# Patient Record
Sex: FEMALE | Race: WHITE | NOT HISPANIC OR LATINO | Employment: FULL TIME | URBAN - METROPOLITAN AREA
[De-identification: names, ages, dates, MRNs, and addresses within clinical notes are randomized per-mention and may not be internally consistent; named-entity substitution may affect disease eponyms.]

---

## 2021-07-30 ENCOUNTER — OFFICE VISIT (OUTPATIENT)
Dept: FAMILY MEDICINE | Facility: CLINIC | Age: 51
End: 2021-07-30
Payer: COMMERCIAL

## 2021-07-30 VITALS
BODY MASS INDEX: 25.03 KG/M2 | SYSTOLIC BLOOD PRESSURE: 143 MMHG | WEIGHT: 169 LBS | HEART RATE: 91 BPM | DIASTOLIC BLOOD PRESSURE: 90 MMHG | TEMPERATURE: 97.1 F | OXYGEN SATURATION: 97 % | HEIGHT: 69 IN

## 2021-07-30 DIAGNOSIS — F43.22 ADJUSTMENT DISORDER WITH ANXIOUS MOOD: Primary | ICD-10-CM

## 2021-07-30 DIAGNOSIS — Z13.6 CARDIOVASCULAR SCREENING; LDL GOAL LESS THAN 160: ICD-10-CM

## 2021-07-30 DIAGNOSIS — Z13.1 SCREENING FOR DIABETES MELLITUS (DM): ICD-10-CM

## 2021-07-30 DIAGNOSIS — R79.89 ELEVATED LFTS: ICD-10-CM

## 2021-07-30 DIAGNOSIS — Z13.29 SCREENING FOR THYROID DISORDER: ICD-10-CM

## 2021-07-30 DIAGNOSIS — Z12.31 ENCOUNTER FOR SCREENING MAMMOGRAM FOR BREAST CANCER: ICD-10-CM

## 2021-07-30 DIAGNOSIS — R73.09 ELEVATED GLUCOSE: ICD-10-CM

## 2021-07-30 PROBLEM — S43.102A ACROMIOCLAVICULAR JOINT SEPARATION, LEFT, INITIAL ENCOUNTER: Status: ACTIVE | Noted: 2020-04-30

## 2021-07-30 PROCEDURE — 99204 OFFICE O/P NEW MOD 45 MIN: CPT | Performed by: NURSE PRACTITIONER

## 2021-07-30 RX ORDER — HYDROXYZINE HYDROCHLORIDE 25 MG/1
25-50 TABLET, FILM COATED ORAL EVERY 8 HOURS PRN
Qty: 30 TABLET | Refills: 1 | Status: SHIPPED | OUTPATIENT
Start: 2021-07-30 | End: 2022-02-11

## 2021-07-30 ASSESSMENT — ENCOUNTER SYMPTOMS
CHILLS: 0
BREAST MASS: 0
CONSTIPATION: 0
PARESTHESIAS: 0
HEADACHES: 0
EYE PAIN: 0
JOINT SWELLING: 0
ABDOMINAL PAIN: 0
MYALGIAS: 0
HEMATOCHEZIA: 0
SORE THROAT: 0
HEARTBURN: 0
DYSURIA: 0
COUGH: 0
FEVER: 0
DIARRHEA: 0
WEAKNESS: 0
FREQUENCY: 0
PALPITATIONS: 0
SHORTNESS OF BREATH: 0
HEMATURIA: 0
DIZZINESS: 0
ARTHRALGIAS: 1
NERVOUS/ANXIOUS: 1
NAUSEA: 0

## 2021-07-30 ASSESSMENT — MIFFLIN-ST. JEOR: SCORE: 1455.96

## 2021-07-30 NOTE — PROGRESS NOTES
Subjective   Samantha is a 50 year old who presents for the following health issues     Healthy Habits:     Getting at least 3 servings of Calcium per day:  Yes    Bi-annual eye exam:  NO    Dental care twice a year:  Yes    Sleep apnea or symptoms of sleep apnea:  None    Diet:  Regular (no restrictions)    Frequency of exercise:  2-3 days/week    Duration of exercise:  15-30 minutes    Taking medications regularly:  Not Applicable    Medication side effects:  Not applicable    PHQ-2 Total Score: 0    Additional concerns today:  No       Pt here today to meet with provider to potentially est care  Has some anxiety abut establishing care   Has not taken care of some of some her basic health care   Has some anxiety lately but healthcare is a big trigger  Just lost mom in the last year due to heart disease   Can remember  shots with her anxiety       Labs reviewed in EPIC  BP Readings from Last 3 Encounters:   07/30/21 (!) 143/90   08/13/10 140/80    Wt Readings from Last 3 Encounters:   07/30/21 76.7 kg (169 lb)                  Patient Active Problem List   Diagnosis     Papanicolaou smear of cervix with positive high risk human papilloma virus (HPV) test     No past surgical history on file.    Social History     Tobacco Use     Smoking status: Never Smoker     Smokeless tobacco: Never Used   Substance Use Topics     Alcohol use: No     No family history on file.      No current outpatient medications on file.     Allergies   Allergen Reactions     Pcn [Penicillins]      Stops breathing     Sulfa Drugs Rash         Review of Systems   Constitutional: Negative for chills and fever.   HENT: Negative for congestion, ear pain, hearing loss and sore throat.    Eyes: Negative for pain and visual disturbance.   Respiratory: Negative for cough and shortness of breath.    Cardiovascular: Negative for chest pain, palpitations and peripheral edema.   Gastrointestinal: Negative for abdominal pain, constipation,  "diarrhea, heartburn, hematochezia and nausea.   Breasts:  Negative for tenderness, breast mass and discharge.   Genitourinary: Negative for dysuria, frequency, genital sores, hematuria, pelvic pain, urgency, vaginal bleeding and vaginal discharge.   Musculoskeletal: Positive for arthralgias. Negative for joint swelling and myalgias.        Right hip continues to bother her   Saw orthopedics at [facility]    Skin: Negative for rash.   Neurological: Negative for dizziness, weakness, headaches and paresthesias.   Psychiatric/Behavioral: Negative for mood changes. The patient is nervous/anxious.         Objective    BP (!) 143/90   Pulse 91   Temp 97.1  F (36.2  C) (Tympanic)   Ht 1.753 m (5' 9\")   Wt 76.7 kg (169 lb)   SpO2 97%   BMI 24.96 kg/m    Body mass index is 24.96 kg/m .  Physical Exam   GENERAL APPEARANCE: alert, active and no distress  HENT: ear canals and TM's normal and nose and mouth without ulcers or lesions  NECK: no adenopathy, no asymmetry, masses, or scars and thyroid normal to palpation  RESP: lungs clear to auscultation - no rales, rhonchi or wheezes  CV: regular rates and rhythm and no murmur, click or rub  MS: extremities normal- no gross deformities noted  SKIN: no suspicious lesions or rashes  NEURO: Normal strength and tone, mentation intact and speech normal  PSYCH: mentation appears normal and affect normal/bright  MENTAL STATUS EXAM:  Appearance/Behavior: No apparent distress, Casually groomed and Dressed appropriately for weather  Speech: Normal  Mood/Affect: normal affect  Insight: Adequate    Pending orders and results     Assessment & Plan     Adjustment disorder with anxious mood  Reviewed concept of depression as function of biochemical imbalance of neurotransmitters/rationale for treatment.  Risks and benefits of medication(s) reviewed with patient.  Questions answered.  Counseling advised  Followup appointment in 1month   Patient instructed to call for significant side " effects medications or problems  Patient advised immediate presentation to hospital for suicidal thought, etc.      CARDIOVASCULAR SCREENING; LDL GOAL LESS THAN 160  Will follow up and/or notify patient of  results via My Chart to determine further need for followup  - Lipid panel reflex to direct LDL Fasting    Screening for thyroid disorder  Will follow up and/or notify patient of  results via My Chart to determine further need for followup  - TSH with free T4 reflex    Screening for diabetes mellitus (DM)  - Comprehensive metabolic panel    Encounter for screening mammogram for breast cancer  - *MA Screening Digital Bilateral  956}     See Patient Instructions  Patient Instructions     PLAN:   1.   Symptomatic therapy suggested: will send referral for counseling to begin   2.  Orders Placed This Encounter   Medications     hydrOXYzine (ATARAX) 25 MG tablet     Sig: Take 1-2 tablets (25-50 mg) by mouth every 8 hours as needed for anxiety     Dispense:  30 tablet     Refill:  1     Orders Placed This Encounter   Procedures     *MA Screening Digital Bilateral     Lipid panel reflex to direct LDL Fasting     Comprehensive metabolic panel     TSH with free T4 reflex     3. Patient needs to follow up in if no improvement,or sooner if worsening of symptoms or other symptoms develop.  FURTHER TESTING:       - mammogram  I will place order. Please call [phone] to schedule.  FUTURE LABS:       - Schedule a fasting blood draw    Will follow up and/or notify patient of  results via My Chart to determine further need for followup  Initiated consultation with OSKAR Jean Baptiste  for mental health counseling.       Preventive Health Recommendations  Female Ages 40 to 49    Yearly exam:     See your health care provider every year in order to  1. Review health changes.   2. Discuss preventive care.    3. Review your medicines if your doctor prescribed any.      Get a Pap test every three years (unless you have an abnormal  result and your provider advises testing more often).      If you get Pap tests with HPV test, you only need to test every 5 years, unless you have an abnormal result. You do not need a Pap test if your uterus was removed (hysterectomy) and you have not had cancer.      You should be tested each year for STDs (sexually transmitted diseases), if you're at risk.     Ask your doctor if you should have a mammogram.      Have a colonoscopy (test for colon cancer) if someone in your family has had colon cancer or polyps before age 50.       Have a cholesterol test every 5 years.       Have a diabetes test (fasting glucose) after age 45. If you are at risk for diabetes, you should have this test every 3 years.    Shots: Get a flu shot each year. Get a tetanus shot every 10 years.     Nutrition:     Eat at least 5 servings of fruits and vegetables each day.    Eat whole-grain bread, whole-wheat pasta and brown rice instead of white grains and rice.    Get adequate Calcium and Vitamin D.      Lifestyle    Exercise at least 150 minutes a week (an average of 30 minutes a day, 5 days a week). This will help you control your weight and prevent disease.    Limit alcohol to one drink per day.    No smoking.     Wear sunscreen to prevent skin cancer.    See your dentist every six months for an exam and cleaning..lss      No follow-ups on file.    LI Fuentes [facility]

## 2021-07-30 NOTE — LETTER
August 25, 2021      Samantha Saravia  [address] [facility] [address]        Dear ,        Shannon Escalante, APRN CNP

## 2021-07-30 NOTE — PATIENT INSTRUCTIONS
PLAN:   1.   Symptomatic therapy suggested: will send referral for counseling to begin   2.  Orders Placed This Encounter   Medications     hydrOXYzine (ATARAX) 25 MG tablet     Sig: Take 1-2 tablets (25-50 mg) by mouth every 8 hours as needed for anxiety     Dispense:  30 tablet     Refill:  1     Orders Placed This Encounter   Procedures     *MA Screening Digital Bilateral     Lipid panel reflex to direct LDL Fasting     Comprehensive metabolic panel     TSH with free T4 reflex     3. Patient needs to follow up in if no improvement,or sooner if worsening of symptoms or other symptoms develop.  FURTHER TESTING:       - mammogram  I will place order. Please call [phone] to schedule.  FUTURE LABS:       - Schedule a fasting blood draw    Will follow up and/or notify patient of  results via My Chart to determine further need for followup  Initiated consultation with OSKAR Jean Baptiste  for mental health counseling.       Preventive Health Recommendations  Female Ages 40 to 49    Yearly exam:     See your health care provider every year in order to  1. Review health changes.   2. Discuss preventive care.    3. Review your medicines if your doctor prescribed any.      Get a Pap test every three years (unless you have an abnormal result and your provider advises testing more often).      If you get Pap tests with HPV test, you only need to test every 5 years, unless you have an abnormal result. You do not need a Pap test if your uterus was removed (hysterectomy) and you have not had cancer.      You should be tested each year for STDs (sexually transmitted diseases), if you're at risk.     Ask your doctor if you should have a mammogram.      Have a colonoscopy (test for colon cancer) if someone in your family has had colon cancer or polyps before age 50.       Have a cholesterol test every 5 years.       Have a diabetes test (fasting glucose) after age 45. If you are at risk for diabetes, you should have this test  every 3 years.    Shots: Get a flu shot each year. Get a tetanus shot every 10 years.     Nutrition:     Eat at least 5 servings of fruits and vegetables each day.    Eat whole-grain bread, whole-wheat pasta and brown rice instead of white grains and rice.    Get adequate Calcium and Vitamin D.      Lifestyle    Exercise at least 150 minutes a week (an average of 30 minutes a day, 5 days a week). This will help you control your weight and prevent disease.    Limit alcohol to one drink per day.    No smoking.     Wear sunscreen to prevent skin cancer.    See your dentist every six months for an exam and cleaning..lss

## 2021-07-30 NOTE — LETTER
September 1, 2021      Samantha R Manjit  [address] [facility] [address]        Dear ,    We are writing to inform you of your test results.    {results letter list:[id number]}    Resulted Orders   Hepatitis A Antibody IgG   Result Value Ref Range    Hepatitis A Antibody IgG Nonreactive Nonreactive    Narrative    This assay cannot be used for the diagnosis of acute HAV infection.   Hepatitis B Surface Antibody   Result Value Ref Range    Hepatitis B Surface Antibody 0.63 <8.00 m[IU]/mL      Comment:      Nonreactive, No antibody detected when the value is less than 8.00 mIU/mL.   HEPATITIS B CORE ANTIBODY   Result Value Ref Range    Hepatitis B Core Antibody Total Nonreactive Nonreactive   HEPATITIS B SURFACE ANTIGEN   Result Value Ref Range    Hepatitis B Surface Antigen Nonreactive Nonreactive   HEPATITS C ANTIBODY   Result Value Ref Range    Hepatitis C Antibody Nonreactive Nonreactive    Narrative    Assay performance characteristics have not been established for newborns, infants, and children.       If you have any questions or concerns, please call the clinic at the number listed above.       Sincerely,      LI Fuentes CNP

## 2021-08-02 ENCOUNTER — TELEPHONE (OUTPATIENT)
Dept: BEHAVIORAL HEALTH | Facility: CLINIC | Age: 51
End: 2021-08-02

## 2021-08-02 NOTE — TELEPHONE ENCOUNTER
Reached out to pt to offer [facility] appt per the request of LI Martin .Left voicemail with behavioral intakes number for scheduling.     
No

## 2021-08-06 ENCOUNTER — LAB (OUTPATIENT)
Dept: LAB | Facility: CLINIC | Age: 51
End: 2021-08-06
Payer: COMMERCIAL

## 2021-08-06 ENCOUNTER — VIRTUAL VISIT (OUTPATIENT)
Dept: BEHAVIORAL HEALTH | Facility: CLINIC | Age: 51
End: 2021-08-06
Payer: COMMERCIAL

## 2021-08-06 DIAGNOSIS — Z13.6 CARDIOVASCULAR SCREENING; LDL GOAL LESS THAN 160: ICD-10-CM

## 2021-08-06 DIAGNOSIS — Z13.29 SCREENING FOR THYROID DISORDER: ICD-10-CM

## 2021-08-06 DIAGNOSIS — Z13.1 SCREENING FOR DIABETES MELLITUS (DM): ICD-10-CM

## 2021-08-06 DIAGNOSIS — F40.00 AGORAPHOBIA: Primary | ICD-10-CM

## 2021-08-06 LAB
ALBUMIN SERPL-MCNC: 3.9 G/DL (ref 3.4–5)
ALP SERPL-CCNC: 147 U/L (ref 40–150)
ALT SERPL W P-5'-P-CCNC: 87 U/L (ref 0–50)
ANION GAP SERPL CALCULATED.3IONS-SCNC: 7 MMOL/L (ref 3–14)
AST SERPL W P-5'-P-CCNC: 279 U/L (ref 0–45)
BILIRUB SERPL-MCNC: 1.6 MG/DL (ref 0.2–1.3)
BUN SERPL-MCNC: 3 MG/DL (ref 7–30)
CALCIUM SERPL-MCNC: 9.5 MG/DL (ref 8.5–10.1)
CHLORIDE BLD-SCNC: 99 MMOL/L (ref 94–109)
CHOLEST SERPL-MCNC: 215 MG/DL
CO2 SERPL-SCNC: 31 MMOL/L (ref 20–32)
CREAT SERPL-MCNC: 0.59 MG/DL (ref 0.52–1.04)
FASTING STATUS PATIENT QL REPORTED: YES
GFR SERPL CREATININE-BSD FRML MDRD: >90 ML/MIN/1.73M2
GLUCOSE BLD-MCNC: 136 MG/DL (ref 70–99)
HDLC SERPL-MCNC: 151 MG/DL
LDLC SERPL CALC-MCNC: 51 MG/DL
NONHDLC SERPL-MCNC: 64 MG/DL
POTASSIUM BLD-SCNC: 3.1 MMOL/L (ref 3.4–5.3)
PROT SERPL-MCNC: 7.7 G/DL (ref 6.8–8.8)
SODIUM SERPL-SCNC: 137 MMOL/L (ref 133–144)
TRIGL SERPL-MCNC: 65 MG/DL
TSH SERPL DL<=0.005 MIU/L-ACNC: 1.88 MU/L (ref 0.4–4)

## 2021-08-06 PROCEDURE — 87340 HEPATITIS B SURFACE AG IA: CPT | Performed by: NURSE PRACTITIONER

## 2021-08-06 PROCEDURE — 36415 COLL VENOUS BLD VENIPUNCTURE: CPT

## 2021-08-06 PROCEDURE — 90834 PSYTX W PT 45 MINUTES: CPT | Mod: GT | Performed by: SOCIAL WORKER

## 2021-08-06 PROCEDURE — 84443 ASSAY THYROID STIM HORMONE: CPT

## 2021-08-06 PROCEDURE — 86706 HEP B SURFACE ANTIBODY: CPT | Performed by: NURSE PRACTITIONER

## 2021-08-06 PROCEDURE — 86803 HEPATITIS C AB TEST: CPT | Performed by: NURSE PRACTITIONER

## 2021-08-06 PROCEDURE — 80061 LIPID PANEL: CPT

## 2021-08-06 PROCEDURE — 86704 HEP B CORE ANTIBODY TOTAL: CPT | Performed by: NURSE PRACTITIONER

## 2021-08-06 PROCEDURE — 80053 COMPREHEN METABOLIC PANEL: CPT

## 2021-08-06 ASSESSMENT — COLUMBIA-SUICIDE SEVERITY RATING SCALE - C-SSRS
1. IN THE PAST MONTH, HAVE YOU WISHED YOU WERE DEAD OR WISHED YOU COULD GO TO SLEEP AND NOT WAKE UP?: NO
2. HAVE YOU ACTUALLY HAD ANY THOUGHTS OF KILLING YOURSELF LIFETIME?: NO
TOTAL  NUMBER OF INTERRUPTED ATTEMPTS LIFETIME: NO
ATTEMPT PAST THREE MONTHS: NO
2. HAVE YOU ACTUALLY HAD ANY THOUGHTS OF KILLING YOURSELF?: NO
3. HAVE YOU BEEN THINKING ABOUT HOW YOU MIGHT KILL YOURSELF?: NO
5. HAVE YOU STARTED TO WORK OUT OR WORKED OUT THE DETAILS OF HOW TO KILL YOURSELF? DO YOU INTEND TO CARRY OUT THIS PLAN?: NO
6. HAVE YOU EVER DONE ANYTHING, STARTED TO DO ANYTHING, OR PREPARED TO DO ANYTHING TO END YOUR LIFE?: NO
ATTEMPT LIFETIME: NO
TOTAL  NUMBER OF ABORTED OR SELF INTERRUPTED ATTEMPTS PAST LIFETIME: NO
4. HAVE YOU HAD THESE THOUGHTS AND HAD SOME INTENTION OF ACTING ON THEM?: NO
5. HAVE YOU STARTED TO WORK OUT OR WORKED OUT THE DETAILS OF HOW TO KILL YOURSELF? DO YOU INTEND TO CARRY OUT THIS PLAN?: NO
TOTAL  NUMBER OF ABORTED OR SELF INTERRUPTED ATTEMPTS PAST 3 MONTHS: NO
1. IN THE PAST MONTH, HAVE YOU WISHED YOU WERE DEAD OR WISHED YOU COULD GO TO SLEEP AND NOT WAKE UP?: NO
6. HAVE YOU EVER DONE ANYTHING, STARTED TO DO ANYTHING, OR PREPARED TO DO ANYTHING TO END YOUR LIFE?: NO
TOTAL  NUMBER OF INTERRUPTED ATTEMPTS PAST 3 MONTHS: NO

## 2021-08-06 NOTE — PROGRESS NOTES
[facility] Primary Care: : Integrated Behavioral Health  August 6, 2021      Behavioral Health Clinician Progress Note    Patient Name: Samantha Saravia           Service Type:  Individual      Service Location:   Face to Face in Clinic     Session Start Time: 11:00am  Session End Time: 11:41am      Session Length: 38 - 52      Attendees: Client    Visit Activities (Refresh list every visit): [facility] and [facility] Only    Diagnostic Assessment Date: Will complete by the fourth visit  Treatment Plan Review Date: Provider and patient will continue to build rapport and discuss tx goals in their next few sessions.    Telemedicine Visit: The patient's condition can be safely assessed and treated via synchronous audio and visual telemedicine encounter.      Reason for Telemedicine Visit: Patient has requested telehealth visit and COVID-19    Originating Site (Patient Location): Patient's home    Distant Site (Provider Location): Provider Remote Setting- Home Office    Consent:  The patient/guardian has verbally consented to: the potential risks and benefits of telemedicine (video visit) versus in person care; bill my insurance or make self-payment for services provided; and responsibility for payment of non-covered services.     Mode of Communication:  Video Conference via [employer]    As the provider I attest to compliance with applicable laws and regulations related to telemedicine.      See Flowsheets for today's PHQ-9 and PIETER-7 results  Previous PHQ-9: No flowsheet data found.  Previous PIETER-7: No flowsheet data found.    ERIK LEVEL:  No flowsheet data found.    DATA  Extended Session (60+ minutes): No  Interactive Complexity: No  Crisis: No  BH Patient: No    Treatment Objective(s) Addressed in This Session:  Target Behavior(s): Anxiety    Anxiety: will experience a reduction in anxiety, will develop more effective coping skills to manage anxiety symptoms and will increase ability to function  adaptively    Current Stressors / Issues:    Patient arrived in okay spirits for her individual session.  She reports that she recently saw LI Martin for physical exam which was a huge milestone for her since she has been avoiding medical care for several years.  Patient reports that she has significant anxiety about medical appointments due to the fear of pain and potentially there being something wrong with her medically.  She reports that she had a really nice visit with LI Martin and felt comfortable with the examination.  She reports that she does have a mammogram and lab work scheduled, she was able to make it to her labs this morning despite feeling anxious which provider praised.  Patient reports that she would feel more comfortable if she was able to bring a  with her to the clinic however due to Covid she has been by herself which has been difficult.  Patient acknowledged having providers review what they are going to do and to talk to her during procedures for distraction is helpful.  Provided psychoeducation on anxiety and discussed utilizing regulation before and during her appointments.  Patient was open to utilizing drying/coloring, deep breathing, meditation and rocking.  Patient reports that she liked the techniques suggested and would like to try these for her upcoming appointments.  She will utilize [facility] provider as needed.  Denies suicidal thoughts or history of them.    Progress on Treatment Objective(s) / Homework:  New Objective established this session - PREPARATION (Decided to change - considering how); Intervened by negotiating a change plan and determining options / strategies for behavior change, identifying triggers, exploring social supports, and working towards setting a date to begin behavior change    Motivational Interviewing    MI Intervention: Expressed Empathy/Understanding, Permission to raise concern or advise, Open-ended questions, Reflections: simple and  complex and Change talk (evoked)     Change Talk Expressed by the Patient: Desire to change Reasons to change Need to change Activation    Provider Response to Change Talk: E - Evoked more info from patient about behavior change, A - Affirmed patient's thoughts, decisions, or attempts at behavior change and S - Summarized patient's change talk statements    Also provided psychoeducation about behavioral health condition, symptoms, and treatment options    Care Plan review completed: Yes    Medication Review:  No changes to current psychiatric medication(s)    Medication Compliance:  Yes    Changes in Health Issues:   None reported    Chemical Use Review:   Substance Use: Chemical use reviewed, no active concerns identified      Tobacco Use: No current tobacco use.      Assessment: Current Emotional / Mental Status (status of significant symptoms):  Risk status (Self / Other harm or suicidal ideation)  Patient denies a history of suicidal ideation, suicide attempts, self-injurious behavior, homicidal ideation, homicidal behavior and and other safety concerns  Patient denies current fears or concerns for personal safety.  Patient denies current or recent suicidal ideation or behaviors.  Patient denies current or recent homicidal ideation or behaviors.  Patient denies current or recent self injurious behavior or ideation.  Patient denies other safety concerns.  A safety and risk management plan has not been developed at this time, however patient was encouraged to call [place] [phone] should there be a change in any of these risk factors.    Appearance:   Appropriate   Eye Contact:   Good   Psychomotor Behavior: Normal   Attitude:   Cooperative   Orientation:   All  Speech   Rate / Production: Normal    Volume:  Normal   Mood:    Anxious   Affect:    Appropriate   Thought Content:  Clear  Rumination   Thought Form:  Coherent  Circumstantial  Insight:    Fair     Diagnoses:  1. Agoraphobia        Collateral Reports  Completed:  Routed note to PCP    Plan: (Homework, other):  Patient was given information about behavioral services and encouraged to schedule a follow up appointment with the clinic [facility] as needed.  She was also given information about mental health symptoms and treatment options .  CD Recommendations: No indications of CD issues.  OSKAR Jean Baptiste      ______________________________________________________________________    Integrated Primary Care Behavioral Health Treatment Plan    Patient's Name: Samantha Saravia  YOB: 1970    Date: 8/6/2021    DSM-V Diagnoses: 300.22 (F40.00) Agoraphobia  Psychosocial / Contextual Factors: History of avoiding medical treatment, history of being fearful of needles and injections  WHODAS: Will complete during diagnostic assessment    Referral / Collaboration:  Referral to another professional/service is not indicated at this time..    Anticipated number of session or this episode of care: 3-4    Provider and patient will continue to build rapport and discuss tx goals in their next few sessions.       Patient has reviewed and agreed to the above plan.      OSKAR Jean Baptiste  August 6, 2021

## 2021-08-09 LAB
HBV CORE AB SERPL QL IA: NONREACTIVE
HBV SURFACE AB SERPL IA-ACNC: 0.63 M[IU]/ML
HBV SURFACE AG SERPL QL IA: NONREACTIVE
HCV AB SERPL QL IA: NONREACTIVE

## 2021-08-26 ENCOUNTER — TELEPHONE (OUTPATIENT)
Dept: FAMILY MEDICINE | Facility: CLINIC | Age: 51
End: 2021-08-26

## 2021-08-26 NOTE — TELEPHONE ENCOUNTER
Routed to provider as FYI. Pt is now scheduled.    Pt returned call to RN. RN relayed provider message below as written and reviewed letter message with pt. Pt states she did receive the letter, but was traveling for work, so she did not receive the message timely due to her travel.     RN assisted pt with scheduling fasting lab-only appointment. Pt declined sooner available lab-only appointment.   RN advised LI Bal CNP, is not in clinic again until 9/9/21 and offered appointment to follow-up labs with other available providers sooner. Pt states she prefers to await LI Bal CNPs return to follow-up on the lab results and plan. Pt scheduled.    Next 5 appointments (look out 90 days)    Sep 09, 2021  8:40 AM  SHORT with LI Fuentes CNP  [facility] ([facility] ) [address] [place] [id number]  [phone]        AUG 31, 2021 08:15 AM - LAB       Pt indicates understanding of issues and agrees with the plan.    Ester Langley, ELIAZARN, RN

## 2021-08-26 NOTE — RESULT ENCOUNTER NOTE
Please call  Samantha Saravia,    Needs follow up on her labs  Ok to do video or telephone if need be   We sent a letter but needs follow up appointment   Hepatitis panel is normal    Please contact us if you have any questions.    Shannon Escalante, CNP

## 2021-08-26 NOTE — TELEPHONE ENCOUNTER
Called patient, left message with phone number to call back.   Patient had abnormal lab results, glucose, liver function, low potassium. It needs further evaluation as to why she had these abnormal results.       Please call  Samantha Saravia,     Needs follow up on her labs  Ok to do video or telephone if need be   We sent a letter but needs follow up appointment   Hepatitis panel is normal    Please contact us if you have any questions.     Shannon Escalante CNP      A Letter was sent with this message from LI Bal CNP regarding lab results below:   Did the patient get this message below?      Shannon Escalante APRN CNP   8/9/2021  1:06 AM CDT Back to Top      Please call   -Cholesterol levels (LDL,HDL, Triglycerides) are normal.  ADVISE: rechecking in 1 year.   -Liver and gallbladder tests (ALT,AST, Alk phos,bilirubin) are significantly elevated. ADVISE: recheck in a week   -Kidney function (GFR) is normal.  -Sodium is normal.  -Potassium is decreased.  ADVISE: rechecking this week   -Calcium is normal.  Glucose is elevated concerning for diabetes will also check A1C this week   -TSH (thyroid stimulating hormone) level is normal which indicates normal thyroid function.  Shannon Escalante NP, APRN CNP         Ester Turk RN, [facility]

## 2021-08-31 ENCOUNTER — LAB (OUTPATIENT)
Dept: LAB | Facility: CLINIC | Age: 51
End: 2021-08-31
Payer: COMMERCIAL

## 2021-08-31 DIAGNOSIS — R73.09 ELEVATED GLUCOSE: ICD-10-CM

## 2021-08-31 DIAGNOSIS — R79.89 ELEVATED LFTS: Primary | ICD-10-CM

## 2021-08-31 DIAGNOSIS — R79.89 ELEVATED LFTS: ICD-10-CM

## 2021-08-31 LAB
ALBUMIN SERPL-MCNC: 3.6 G/DL (ref 3.4–5)
ALP SERPL-CCNC: 195 U/L (ref 40–150)
ALT SERPL W P-5'-P-CCNC: 89 U/L (ref 0–50)
ANION GAP SERPL CALCULATED.3IONS-SCNC: 4 MMOL/L (ref 3–14)
AST SERPL W P-5'-P-CCNC: 224 U/L (ref 0–45)
BILIRUB SERPL-MCNC: 1.2 MG/DL (ref 0.2–1.3)
BUN SERPL-MCNC: 4 MG/DL (ref 7–30)
CALCIUM SERPL-MCNC: 9.4 MG/DL (ref 8.5–10.1)
CHLORIDE BLD-SCNC: 107 MMOL/L (ref 94–109)
CO2 SERPL-SCNC: 29 MMOL/L (ref 20–32)
CREAT SERPL-MCNC: 0.42 MG/DL (ref 0.52–1.04)
GFR SERPL CREATININE-BSD FRML MDRD: >90 ML/MIN/1.73M2
GLUCOSE BLD-MCNC: 130 MG/DL (ref 70–99)
HAV IGG SER QL IA: NONREACTIVE
HBA1C MFR BLD: 5.3 % (ref 0–5.6)
POTASSIUM BLD-SCNC: 4.5 MMOL/L (ref 3.4–5.3)
PROT SERPL-MCNC: 7.8 G/DL (ref 6.8–8.8)
SODIUM SERPL-SCNC: 140 MMOL/L (ref 133–144)

## 2021-08-31 PROCEDURE — 36415 COLL VENOUS BLD VENIPUNCTURE: CPT | Performed by: NURSE PRACTITIONER

## 2021-08-31 PROCEDURE — 80053 COMPREHEN METABOLIC PANEL: CPT

## 2021-08-31 PROCEDURE — 83036 HEMOGLOBIN GLYCOSYLATED A1C: CPT

## 2021-08-31 PROCEDURE — 86708 HEPATITIS A ANTIBODY: CPT | Performed by: NURSE PRACTITIONER

## 2021-08-31 NOTE — LETTER
September 1, 2021      Samantha Saravia  [address] [facility] [address]        Jinny So this is  and I am covering for Shannon Escalante   Your liver function tests are still abnormal   There is not been much improvement   One of the tests AST showed a little improvement but the ALT and alkaline phosphatase are still elevated   Your hepatitis panel was negative   I believe we need to investigate further the cause of this elevated liver function tests   I see that you are going to see Shannon on 9 September and she will discuss more about this at that point         Please contact the clinic if you have additional questions.  Thank you.     Sincerely,     Nazario Villatoro MD       Resulted Orders   Comprehensive metabolic panel   Result Value Ref Range    Sodium 140 133 - 144 mmol/L    Potassium 4.5 3.4 - 5.3 mmol/L    Chloride 107 94 - 109 mmol/L    Carbon Dioxide (CO2) 29 20 - 32 mmol/L    Anion Gap 4 3 - 14 mmol/L    Urea Nitrogen 4 (L) 7 - 30 mg/dL    Creatinine 0.42 (L) 0.52 - 1.04 mg/dL    Calcium 9.4 8.5 - 10.1 mg/dL    Glucose 130 (H) 70 - 99 mg/dL    Alkaline Phosphatase 195 (H) 40 - 150 U/L     (H) 0 - 45 U/L    ALT 89 (H) 0 - 50 U/L    Protein Total 7.8 6.8 - 8.8 g/dL    Albumin 3.6 3.4 - 5.0 g/dL    Bilirubin Total 1.2 0.2 - 1.3 mg/dL    GFR Estimate >90 >60 mL/min/1.73m2      Comment:      As of July 11, 2021, eGFR is calculated by the CKD-EPI creatinine equation, without race adjustment. eGFR can be influenced by muscle mass, exercise, and diet. The reported eGFR is an estimation only and is only applicable if the renal function is stable.   Hemoglobin A1c   Result Value Ref Range    Hemoglobin A1C 5.3 0.0 - 5.6 %      Comment:      Normal <5.7%   Prediabetes 5.7-6.4%    Diabetes 6.5% or higher     Note: Adopted from ADA consensus guidelines.

## 2021-09-01 ENCOUNTER — TELEPHONE (OUTPATIENT)
Dept: FAMILY MEDICINE | Facility: CLINIC | Age: 51
End: 2021-09-01

## 2021-09-01 NOTE — TELEPHONE ENCOUNTER
This writer attempted to contact Samantha on 09/01/21      Reason for call results and left message.      If patient calls back:   Relay message from provider below, (read verbatim), document that pt called and close encounter        Neelam Yeboah RN

## 2021-09-01 NOTE — TELEPHONE ENCOUNTER
----- Message from Diana Kingston sent at 9/1/2021  5:40 AM CDT -----    ----- Message -----  From: Shannon Escalante APRN CNP  Sent: 8/31/2021   9:06 PM CDT  To: [facility]    Please call   Liver tests are elevated and hepatitis labs are normal   Next step is we need to have her get a abdominal ultrasound and I will refer her to liver specialist as well   I will place order. Please call [phone] to schedule.    Please call [phone] to make appointment  if you do not hear from referrals in the next few days.     Shannon Escalante, NP, APRN CNP

## 2021-09-01 NOTE — TELEPHONE ENCOUNTER
Patient/parent is informed of MD note below, as it is written. Verbalized good understanding.    Phone number for liver specialist is provided.        [facility]   [address] [address] [place] [id number]   Phone: [phone]        Amaris Zhang RN

## 2021-09-03 ENCOUNTER — TELEPHONE (OUTPATIENT)
Dept: GASTROENTEROLOGY | Facility: CLINIC | Age: 51
End: 2021-09-03

## 2021-09-03 DIAGNOSIS — R79.89 ELEVATED LFTS: Primary | ICD-10-CM

## 2021-09-03 NOTE — TELEPHONE ENCOUNTER
Lab orders placed.    Sanjuana TORRES LPN  Hepatology Clinic        Health Call Center    Phone Message    May a detailed message be left on voicemail: yes     Reason for Call: Order(s): Other:   Reason for requested: Lab Orders  Date needed: ASAP  Provider name: Bahman Cummings      Action Taken: Message routed to:  [facility]    Travel Screening: Not Applicable

## 2021-09-13 NOTE — TELEPHONE ENCOUNTER
RECORDS RECEIVED FROM: Internal   Appt Date: 09.17.2021   NOTES STATUS DETAILS   OFFICE NOTE from referring provider Internal 08.31.2021 Consult Shannon Escalante APRN CNP   OFFICE NOTES from other specialists N/A    DISCHARGE SUMMARY from hospital N/A    MEDICATION LIST Internal / CE    LIVER BIOSPY (IF APPLICABLE)      PATHOLOGY REPORTS  N/A    IMAGING     ENDOSCOPY (IF AVAILABLE) N/A    COLONOSCOPY (IF AVAILABLE) N/A    ULTRASOUND LIVER N/A    CT OF ABDOMEN N/A    MRI OF LIVER N/A    FIBROSCAN, US ELASTOGRAPHY, FIBROSIS SCAN, MR ELASTOGRAPHY N/A    LABS     HEPATIC PANEL (LIVER PANEL) N/A    BASIC METABOLIC PANEL N/A    COMPLETE METABOLIC PANEL N/A    COMPLETE BLOOD COUNT (CBC) N/A    INTERNATIONAL NORMALIZED RATIO (INR) N/A    HEPATITIS C ANTIBODY Internal 08.06.2021   HEPATITIS C VIRAL LOAD/PCR N/A    HEPATITIS C GENOTYPE N/A    HEPATITIS B SURFACE ANTIGEN Internal 08.06.2021   HEPATITIS B SURFACE ANTIBODY Internal 08.06.2021   HEPATITIS B DNA QUANT LEVEL N/A    HEPATITIS B CORE ANTIBODY Internal 08.06.2021

## 2021-09-14 ENCOUNTER — TELEPHONE (OUTPATIENT)
Dept: GASTROENTEROLOGY | Facility: CLINIC | Age: 51
End: 2021-09-14

## 2021-09-17 ENCOUNTER — PRE VISIT (OUTPATIENT)
Dept: GASTROENTEROLOGY | Facility: CLINIC | Age: 51
End: 2021-09-17

## 2021-10-12 ENCOUNTER — TELEPHONE (OUTPATIENT)
Dept: FAMILY MEDICINE | Facility: CLINIC | Age: 51
End: 2021-10-12

## 2021-10-12 NOTE — TELEPHONE ENCOUNTER
Patient Quality Outreach Summary      Summary:    Patient is due/failing the following:   Colonoscopy    Type of outreach:    Sent letter.    Questions for provider review:    None                                                                                                                    MARQUIS Antunez.

## 2021-10-22 ENCOUNTER — ANCILLARY PROCEDURE (OUTPATIENT)
Dept: ULTRASOUND IMAGING | Facility: CLINIC | Age: 51
End: 2021-10-22
Attending: NURSE PRACTITIONER
Payer: COMMERCIAL

## 2021-10-22 ENCOUNTER — LAB (OUTPATIENT)
Dept: LAB | Facility: CLINIC | Age: 51
End: 2021-10-22
Payer: COMMERCIAL

## 2021-10-22 DIAGNOSIS — R79.89 ELEVATED LFTS: ICD-10-CM

## 2021-10-22 LAB
ALBUMIN SERPL-MCNC: 3.8 G/DL (ref 3.4–5)
ALP SERPL-CCNC: 156 U/L (ref 40–150)
ALT SERPL W P-5'-P-CCNC: 76 U/L (ref 0–50)
ANION GAP SERPL CALCULATED.3IONS-SCNC: 7 MMOL/L (ref 3–14)
AST SERPL W P-5'-P-CCNC: 147 U/L (ref 0–45)
BILIRUB DIRECT SERPL-MCNC: 0.5 MG/DL (ref 0–0.2)
BILIRUB SERPL-MCNC: 1 MG/DL (ref 0.2–1.3)
BUN SERPL-MCNC: 5 MG/DL (ref 7–30)
CALCIUM SERPL-MCNC: 9.5 MG/DL (ref 8.5–10.1)
CHLORIDE BLD-SCNC: 107 MMOL/L (ref 94–109)
CO2 SERPL-SCNC: 26 MMOL/L (ref 20–32)
CREAT SERPL-MCNC: 0.44 MG/DL (ref 0.52–1.04)
ERYTHROCYTE [DISTWIDTH] IN BLOOD BY AUTOMATED COUNT: 13.1 % (ref 10–15)
GFR SERPL CREATININE-BSD FRML MDRD: >90 ML/MIN/1.73M2
GLUCOSE BLD-MCNC: 131 MG/DL (ref 70–99)
HCT VFR BLD AUTO: 41 % (ref 35–47)
HGB BLD-MCNC: 14.6 G/DL (ref 11.7–15.7)
INR PPP: 1.08 (ref 0.85–1.15)
MCH RBC QN AUTO: 42.8 PG (ref 26.5–33)
MCHC RBC AUTO-ENTMCNC: 35.6 G/DL (ref 31.5–36.5)
MCV RBC AUTO: 120 FL (ref 78–100)
PLATELET # BLD AUTO: 238 10E3/UL (ref 150–450)
POTASSIUM BLD-SCNC: 4 MMOL/L (ref 3.4–5.3)
PROT SERPL-MCNC: 8.2 G/DL (ref 6.8–8.8)
RBC # BLD AUTO: 3.41 10E6/UL (ref 3.8–5.2)
SODIUM SERPL-SCNC: 140 MMOL/L (ref 133–144)
WBC # BLD AUTO: 5.3 10E3/UL (ref 4–11)

## 2021-10-22 PROCEDURE — 85027 COMPLETE CBC AUTOMATED: CPT

## 2021-10-22 PROCEDURE — 82728 ASSAY OF FERRITIN: CPT

## 2021-10-22 PROCEDURE — 83516 IMMUNOASSAY NONANTIBODY: CPT | Mod: 90

## 2021-10-22 PROCEDURE — 86038 ANTINUCLEAR ANTIBODIES: CPT

## 2021-10-22 PROCEDURE — 76700 US EXAM ABDOM COMPLETE: CPT | Performed by: RADIOLOGY

## 2021-10-22 PROCEDURE — 80053 COMPREHEN METABOLIC PANEL: CPT

## 2021-10-22 PROCEDURE — 83550 IRON BINDING TEST: CPT

## 2021-10-22 PROCEDURE — 99000 SPECIMEN HANDLING OFFICE-LAB: CPT

## 2021-10-22 PROCEDURE — 82248 BILIRUBIN DIRECT: CPT

## 2021-10-22 PROCEDURE — 80321 ALCOHOLS BIOMARKERS 1OR 2: CPT | Mod: 90

## 2021-10-22 PROCEDURE — 81332 SERPINA1 GENE: CPT | Mod: 90

## 2021-10-22 PROCEDURE — 85610 PROTHROMBIN TIME: CPT

## 2021-10-22 PROCEDURE — 82103 ALPHA-1-ANTITRYPSIN TOTAL: CPT | Mod: 90

## 2021-10-22 PROCEDURE — 86039 ANTINUCLEAR ANTIBODIES (ANA): CPT

## 2021-10-22 PROCEDURE — 36415 COLL VENOUS BLD VENIPUNCTURE: CPT

## 2021-10-22 NOTE — LETTER
October 29, 2021       TO: Samantha Saravia  [address] [facility] [address]       Dear Ms. Saravia,    We are writing to inform you of your test results.    All your labs are back.    Labs for autoimmune hepatitis and a condition called PBC are negative.     Labs for Celiac disease are negative.     Labs for evaluating alcohol metabolites were elevated and very likely a contributing factor in your labs being elevated.     You do not have a genetic condition called alpha-1-anti-trypsin deficiency.     Your iron storage levels were a bit elevated, so will do some follow up testing to evaluate for a genetic condition. Levels can also be higher with fatty liver disease and alcohol use.     Overall, continue working on lifestyle changes discussed in our visit.     It was a pleasure to see you at your recent visit. Please let me know if you have any questions or concerns.     Clinic Staff - [phone] option [phone]     Sincerely,     Bahman Cummings PA-C   [address] [place] [address], Mail Code [id number]  [place]  [address].

## 2021-10-23 LAB — SMA IGG SER-ACNC: 18 UNITS

## 2021-10-25 ENCOUNTER — TELEPHONE (OUTPATIENT)
Dept: FAMILY MEDICINE | Facility: CLINIC | Age: 51
End: 2021-10-25

## 2021-10-25 ENCOUNTER — VIRTUAL VISIT (OUTPATIENT)
Dept: GASTROENTEROLOGY | Facility: CLINIC | Age: 51
End: 2021-10-25
Attending: PHYSICIAN ASSISTANT
Payer: COMMERCIAL

## 2021-10-25 DIAGNOSIS — R79.89 ELEVATED LFTS: Primary | ICD-10-CM

## 2021-10-25 LAB
ANA PAT SER IF-IMP: ABNORMAL
ANA SER QL IF: ABNORMAL
ANA TITR SER IF: ABNORMAL {TITER}
FERRITIN SERPL-MCNC: 472 NG/ML (ref 8–252)
IRON SATN MFR SERPL: 48 % (ref 15–46)
IRON SERPL-MCNC: 154 UG/DL (ref 35–180)
TIBC SERPL-MCNC: 324 UG/DL (ref 240–430)
TTG IGA SER-ACNC: 1.6 U/ML
TTG IGG SER-ACNC: 0.9 U/ML

## 2021-10-25 PROCEDURE — 99204 OFFICE O/P NEW MOD 45 MIN: CPT | Mod: GT | Performed by: PHYSICIAN ASSISTANT

## 2021-10-25 NOTE — PROGRESS NOTES
Samantha is a 51 year old who is being evaluated via a billable video visit.      How would you like to obtain your AVS? MyChart  If the video visit is dropped, the invitation should be resent by: Text to cell phone: [phone]  Will anyone else be joining your video visit? No      Video Start Time: 9:27 am   Video-Visit Details    Type of service:  Video Visit    Video End Time: 9:49 am   + 18 minutes of phone conversation     Originating Location (pt. Location): Home    Distant Location (provider location):  [facility] [place]     Platform used for Video Visit: [facility]     Hepatology Clinic note  Samantha Saravia   Date of Birth 1970  Date of Service 10/25/2021    REASON FOR CONSULTATION: Elevated LFT's   REFERRING PROVIDER: LI Bal          Assessment/plan:   Samantha Saravia is a 51 year old female with history of elevated LFT's and ultrasound findings of hepatomegaly and hepatic steatosis. Currently no biochemical or radiologic stigmata of advanced liver disease. Risk factors for fatty liver disease includes: overweight and routine alcohol use (more on weekends).  We discussed fatty liver treatment includes slow gradual weight loss, as well as optimization of risk factors such as HTN, elevated blood glucose or cholesterol as needed and reduction of alcohol. She did not think that would be an issue at this time. Will also rule out genetic and autoimmune causes of hepatobiliary disease. Low suspicion of autoimmune liver disease with initial serologies.       - AMA, Iron panel, TTG   - Repeat hepatic panel in a few months  - Avoid alcohol   - Improve nutrition: emphasizing limit carbohydrates, especially simple carbohydrates.  - Continue physical activity: maintain physical activity more than 150 minutes a week  - Maintain health weight   - Optimization of mental health   - Recommend checking blood pressure at home  - Follow-up in six months or sooner as needed    Bahman Cummings PA-C    [facility]     -----------------------------------------------------       HPI:   Samantha Saravia is a 51 year old female  presenting for the evaluation of elevated LFT's.     LFT's were first noticed to be abnormal in August 2021, at which time Alk Phos was 147, ALT 87 and  . Most recent labs showing Alk Phos 156, ALT 76 and . No prior hepatic panel available for review. Had an ultrasound that showed hepatic steatosis and hepatomegaly.      She does not have much of an appetite (last few months) attributed that to stress at work. Might feel a little nauseated in the morning. Eat small amounts. Likes spicy foods/pastas, had some last night and      Previously was overweight, lost 30 lbs with [employer] a few years ago. Has been able to maintain that weight over the past few years. Feel bloated sometimes. Having regular bowel movements daily. No pruritus. Feels some abdominal discomfort, sometimes on right and stomestmie on lower stomach. No overt abdominal pain.     Patient denies jaundice, lower extremity edema, abdominal distension or confusion.  Patient also denies melena, hematochezia or hematemesis.Patient denies  fevers, sweats or chills.    PMH: agoraphobia, adjustment disorder with anxious mood, right hip pain, history of COVID infection October 2020    SMH:  Julian tooth exacraction     Medications:   Not currently taking any medications     Occassional cigarettes. Does drink alcohol, currently on weekends and sometimes throughout the week- may have Vodka seltzer 3-4 drinks, approximately 10 days a month. No previous IV/IN drug use.  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 Stress level has worsened. No family history of liver disease or liver cancer. Mom with chronic heart issues/COPD.     Previous work-up:  HCV antibody nonreactive   HAV Ab IgG   HBV SAb  0.63  HBV SAg nonreactive  HBV CAb nonreactive  HIV:  SANDRA:  nonreactive  F-actin 18  AMA:  Iron panel:  Ferritin   Iron Sats:   TTG normal   Alpha-1-antripsin  Ceruloplasmin   TSH   Cholesterol Total 215   HDL  LDL  Triglycerides  Hemoglobin A1c 5.3     Medical hx Surgical hx   Past Medical History:   Diagnosis Date     Acromioclavicular joint separation, left, initial encounter 4/30/2020    No past surgical history on file.              Medications:     Current Outpatient Medications   Medication     hydrOXYzine (ATARAX) 25 MG tablet     No current facility-administered medications for this visit.            Allergies:     Allergies   Allergen Reactions     Pcn [Penicillins]      Stops breathing     Sulfa Drugs Rash            Social History:     Social History     Socioeconomic History     Marital status:      Spouse name: Not on file     Number of children: Not on file     Years of education: Not on file     Highest education level: Not on file   Occupational History     Not on file   Tobacco Use     Smoking status: Never Smoker     Smokeless tobacco: Never Used   Substance and Sexual Activity     Alcohol use: No     Drug use: Not on file     Sexual activity: Not on file   Other Topics Concern     Parent/sibling w/ CABG, MI or angioplasty before 65F 55M? Not Asked   Social History Narrative    Lives with family      Social Determinants of Health     Financial Resource Strain:      Difficulty of Paying Living Expenses:    Food Insecurity:      Worried About Running Out of Food in the Last Year:      Ran Out of Food in the Last Year:    Transportation Needs:      Lack of Transportation (Medical):      Lack of Transportation (Non-Medical):    Physical Activity:      Days of Exercise per Week:      Minutes of Exercise per Session:    Stress:      Feeling of Stress :    Social Connections:      Frequency of Communication with Friends and Family:      Frequency of Social Gatherings with Friends and Family:      Attends L[employer] Services:      Active Member of Clubs or  Organizations:      Attends Club or Organization Meetings:      Marital Status:    Intimate Partner Violence:      Fear of Current or Ex-Partner:      Emotionally Abused:      Physically Abused:      Sexually Abused:             Family History:   No family history on file.           Review of Systems:   Gen: See HPI     HEENT: No change in vision or hearing, mouth sores, dysphagia, lymph nodes  Resp: No shortness of breath, coughing, hx of asthma  CV: No chest pain, palpitations, syncope   GI: See HPI  : No dysuria, history of stones, urine color    Skin: No rash; no pruritus or psoriasis  MS: No arthralgias, myalgias, joint swelling  Neuro: No memory changes, confusion, numbness    Heme: No difficulty clotting, bruising, bleeding  Psych:  No anxiety, depression, agitation          Physical Exam:   GENERAL: healthy, alert and no distress  EYES: Eyes grossly normal to inspection, conjunctivae and sclerae normal  RESP: no audible wheeze, cough, or visible cyanosis.  No visible retractions or increased work of breathing.  Able to speak fully in complete sentences  NEURO: Cranial nerves grossly intact, mentation intact and speech normal  PSYCH: mentation appears normal, affect normal/bright, judgement and insight intact, normal speech and appearance well-groomed           Data:   Reviewed in person and significant for:    Lab Results   Component Value Date     10/22/2021      Lab Results   Component Value Date    POTASSIUM 4.0 10/22/2021     Lab Results   Component Value Date    CHLORIDE 107 10/22/2021     Lab Results   Component Value Date    CO2 26 10/22/2021     Lab Results   Component Value Date    BUN 5 10/22/2021     Lab Results   Component Value Date    CR 0.44 10/22/2021       Lab Results   Component Value Date    WBC 5.3 10/22/2021    WBC 6.8 08/13/2010     Lab Results   Component Value Date    HGB 14.6 10/22/2021    HGB 14.1 08/13/2010     Lab Results   Component Value Date    HCT 41.0 10/22/2021     HCT 41.5 08/13/2010     Lab Results   Component Value Date     10/22/2021    MCV 94 08/13/2010     Lab Results   Component Value Date     10/22/2021     08/13/2010       Lab Results   Component Value Date     10/22/2021     Lab Results   Component Value Date    ALT 76 10/22/2021     No results found for: BILICONJ   Lab Results   Component Value Date    BILITOTAL 1.0 10/22/2021       Lab Results   Component Value Date    ALBUMIN 3.8 10/22/2021     Lab Results   Component Value Date    PROTTOTAL 8.2 10/22/2021      Lab Results   Component Value Date    ALKPHOS 156 10/22/2021       Lab Results   Component Value Date    INR 1.08 10/22/2021         Imaging:      US ABDOMEN COMPLETE:   EXAMINATION: US ABDOMEN COMPLETE,  10/22/2021 7:37 AM      COMPARISON: None.     HISTORY: Elevated LFTs     TECHNIQUE: The abdomen was scanned in standard fashion with  specialized ultrasound transducer(s) using both gray-scale and limited  color Doppler techniques.     FINDINGS:  Liver: The liver demonstrates diffusely increased echogenicity and is  enlarged, measuring up to 19.3 cm.. No evidence of a focal hepatic  mass. The main portal vein is patent with antegrade flow, measuring  1.0 cm.     Gallbladder:  There is no wall thickening, pericholecystic fluid,  positive sonographic Nash's sign or evidence for cholelithiasis.     Bile Ducts: Both the intra- and extrahepatic biliary system are of  normal caliber.  The common bile duct measures 4 mm in diameter.     Pancreas: Visualized portions of the head and body of the pancreas are  unremarkable.      Kidneys: Both kidneys are of normal echotexture, without mass or  hydronephrosis.   The craniocaudal dimensions are: right- 10.8 cm,  left- 11.5 cm.     Spleen: The spleen is normal in size,  measuring 10.5 cm in sagittal  dimension.     Aorta and IVC: The visualized portions of the aorta and IVC are  unremarkable.      Fluid: No evidence of ascites or pleural  effusions.                                                                      IMPRESSION:   Hepatomegaly and hepatic steatosis. Otherwise, unremarkable  examination

## 2021-10-25 NOTE — TELEPHONE ENCOUNTER
Patient called back and informed on the message below.    She had a virtual visit with Gastro today.  Some lab orders placed during the visit.    Per note below, patient is scheduled for a lab apt in a month.  Patient will come fasting.  Please place additional labs if needed.    Xi Sánchez RN

## 2021-10-25 NOTE — PATIENT INSTRUCTIONS
Physical Activity   - Increase physical activity level by more than 60 minutes/week   - Maintain physical activity more than 150 minutes a week     Limit Carbohydrates In Diet:   What foods are Carbohydrates?    - Main groups: Grains, Fruits, Starchy Vegetables (corn, potatoes, peas, beans) and Sweets/Desserts    When you do have carbohydrates - be mindful of the portions.   - Have ones with more fiber in the them (brown rice, whole grain breads/pastas)   - Fruits - choose ones with edible skins (apples, pears, etc) or seeds (berries)

## 2021-10-25 NOTE — LETTER
10/25/2021         RE: Samantha Saravia  [address]  [facility] [address]        Dear Colleague,    Thank you for referring your patient, Samantha Saravia, to the [facility] [place]. Please see a copy of my visit note below.    Samantha is a 51 year old who is being evaluated via a billable video visit.      How would you like to obtain your AVS? MyChart  If the video visit is dropped, the invitation should be resent by: Text to cell phone: [phone]  Will anyone else be joining your video visit? No      Video Start Time: 9:27 am   Video-Visit Details    Type of service:  Video Visit    Video End Time: 9:49 am   + 18 minutes of phone conversation     Originating Location (pt. Location): Home    Distant Location (provider location):  [facility] [place]     Platform used for Video Visit: [facility]     Hepatology Clinic note  Samantha Saravia   Date of Birth 1970  Date of Service 10/25/2021    REASON FOR CONSULTATION: Elevated LFT's   REFERRING PROVIDER: LI Bal          Assessment/plan:   Samantha Saravia is a 51 year old female with history of elevated LFT's and ultrasound findings of hepatomegaly and hepatic steatosis. Currently no biochemical or radiologic stigmata of advanced liver disease. Risk factors for fatty liver disease includes: overweight and routine alcohol use (more on weekends).  We discussed fatty liver treatment includes slow gradual weight loss, as well as optimization of risk factors such as HTN, elevated blood glucose or cholesterol as needed and reduction of alcohol. She did not think that would be an issue at this time. Will also rule out genetic and autoimmune causes of hepatobiliary disease. Low suspicion of autoimmune liver disease with initial serologies.       - AMA, Iron panel, TTG   - Repeat hepatic panel in a few months  - Avoid alcohol   - Improve nutrition: emphasizing limit carbohydrates, especially simple carbohydrates.  -  Continue physical activity: maintain physical activity more than 150 minutes a week  - Maintain health weight   - Optimization of mental health   - Recommend checking blood pressure at home  - Follow-up in six months or sooner as needed    Bahman Cummings PA-C   [facility]     -----------------------------------------------------       HPI:   Samantha Saravia is a 51 year old female  presenting for the evaluation of elevated LFT's.     LFT's were first noticed to be abnormal in August 2021, at which time Alk Phos was 147, ALT 87 and  . Most recent labs showing Alk Phos 156, ALT 76 and . No prior hepatic panel available for review. Had an ultrasound that showed hepatic steatosis and hepatomegaly.      She does not have much of an appetite (last few months) attributed that to stress at work. Might feel a little nauseated in the morning. Eat small amounts. Likes spicy foods/pastas, had some last night and      Previously was overweight, lost 30 lbs with [employer] a few years ago. Has been able to maintain that weight over the past few years. Feel bloated sometimes. Having regular bowel movements daily. No pruritus. Feels some abdominal discomfort, sometimes on right and stomestmie on lower stomach. No overt abdominal pain.     Patient denies jaundice, lower extremity edema, abdominal distension or confusion.  Patient also denies melena, hematochezia or hematemesis.Patient denies  fevers, sweats or chills.    PMH: agoraphobia, adjustment disorder with anxious mood, right hip pain, history of COVID infection October 2020    SMH:  [place] tooth exacraction     Medications:   Not currently taking any medications     Occassional cigarettes. Does drink alcohol, currently on weekends and sometimes throughout the week- may have Vodka seltzer 3-4 drinks, approximately 10 days a month. No previous IV/IN drug use.  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 Stress level has worsened. No family history of liver disease or liver cancer. Mom with chronic heart issues/COPD.     Previous work-up:  HCV antibody nonreactive   HAV Ab IgG   HBV SAb  0.63  HBV SAg nonreactive  HBV CAb nonreactive  HIV:  SANDRA: nonreactive  F-actin 18  AMA:  Iron panel:  Ferritin   Iron Sats:   TTG normal   Alpha-1-antripsin  Ceruloplasmin   TSH   Cholesterol Total 215   HDL  LDL  Triglycerides  Hemoglobin A1c 5.3     Medical hx Surgical hx   Past Medical History:   Diagnosis Date     Acromioclavicular joint separation, left, initial encounter 4/30/2020    No past surgical history on file.              Medications:     Current Outpatient Medications   Medication     hydrOXYzine (ATARAX) 25 MG tablet     No current facility-administered medications for this visit.            Allergies:     Allergies   Allergen Reactions     Pcn [Penicillins]      Stops breathing     Sulfa Drugs Rash            Social History:     Social History     Socioeconomic History     Marital status:      Spouse name: Not on file     Number of children: Not on file     Years of education: Not on file     Highest education level: Not on file   Occupational History     Not on file   Tobacco Use     Smoking status: Never Smoker     Smokeless tobacco: Never Used   Substance and Sexual Activity     Alcohol use: No     Drug use: Not on file     Sexual activity: Not on file   Other Topics Concern     Parent/sibling w/ CABG, MI or angioplasty before 65F 55M? Not Asked   Social History Narrative    Lives with family      Social Determinants of Health     Financial Resource Strain:      Difficulty of Paying Living Expenses:    Food Insecurity:      Worried About Running Out of Food in the Last Year:      Ran Out of Food in the Last Year:    Transportation Needs:      Lack of Transportation (Medical):      Lack of Transportation (Non-Medical):    Physical Activity:      Days of Exercise per Week:      Minutes of  Exercise per Session:    Stress:      Feeling of Stress :    Social Connections:      Frequency of Communication with Friends and Family:      Frequency of Social Gatherings with Friends and Family:      Attends Oriental orth[employer] Services:      Active Member of Clubs or Organizations:      Attends Club or Organization Meetings:      Marital Status:    Intimate Partner Violence:      Fear of Current or Ex-Partner:      Emotionally Abused:      Physically Abused:      Sexually Abused:             Family History:   No family history on file.           Review of Systems:   Gen: See HPI     HEENT: No change in vision or hearing, mouth sores, dysphagia, lymph nodes  Resp: No shortness of breath, coughing, hx of asthma  CV: No chest pain, palpitations, syncope   GI: See HPI  : No dysuria, history of stones, urine color    Skin: No rash; no pruritus or psoriasis  MS: No arthralgias, myalgias, joint swelling  Neuro: No memory changes, confusion, numbness    Heme: No difficulty clotting, bruising, bleeding  Psych:  No anxiety, depression, agitation          Physical Exam:   GENERAL: healthy, alert and no distress  EYES: Eyes grossly normal to inspection, conjunctivae and sclerae normal  RESP: no audible wheeze, cough, or visible cyanosis.  No visible retractions or increased work of breathing.  Able to speak fully in complete sentences  NEURO: Cranial nerves grossly intact, mentation intact and speech normal  PSYCH: mentation appears normal, affect normal/bright, judgement and insight intact, normal speech and appearance well-groomed           Data:   Reviewed in person and significant for:    Lab Results   Component Value Date     10/22/2021      Lab Results   Component Value Date    POTASSIUM 4.0 10/22/2021     Lab Results   Component Value Date    CHLORIDE 107 10/22/2021     Lab Results   Component Value Date    CO2 26 10/22/2021     Lab Results   Component Value Date    BUN 5 10/22/2021     Lab Results   Component Value  Date    CR 0.44 10/22/2021       Lab Results   Component Value Date    WBC 5.3 10/22/2021    WBC 6.8 08/13/2010     Lab Results   Component Value Date    HGB 14.6 10/22/2021    HGB 14.1 08/13/2010     Lab Results   Component Value Date    HCT 41.0 10/22/2021    HCT 41.5 08/13/2010     Lab Results   Component Value Date     10/22/2021    MCV 94 08/13/2010     Lab Results   Component Value Date     10/22/2021     08/13/2010       Lab Results   Component Value Date     10/22/2021     Lab Results   Component Value Date    ALT 76 10/22/2021     No results found for: BILICONJ   Lab Results   Component Value Date    BILITOTAL 1.0 10/22/2021       Lab Results   Component Value Date    ALBUMIN 3.8 10/22/2021     Lab Results   Component Value Date    PROTTOTAL 8.2 10/22/2021      Lab Results   Component Value Date    ALKPHOS 156 10/22/2021       Lab Results   Component Value Date    INR 1.08 10/22/2021         Imaging:      US ABDOMEN COMPLETE:   EXAMINATION: US ABDOMEN COMPLETE,  10/22/2021 7:37 AM      COMPARISON: None.     HISTORY: Elevated LFTs     TECHNIQUE: The abdomen was scanned in standard fashion with  specialized ultrasound transducer(s) using both gray-scale and limited  color Doppler techniques.     FINDINGS:  Liver: The liver demonstrates diffusely increased echogenicity and is  enlarged, measuring up to 19.3 cm.. No evidence of a focal hepatic  mass. The main portal vein is patent with antegrade flow, measuring  1.0 cm.     Gallbladder:  There is no wall thickening, pericholecystic fluid,  positive sonographic Nash's sign or evidence for cholelithiasis.     Bile Ducts: Both the intra- and extrahepatic biliary system are of  normal caliber.  The common bile duct measures 4 mm in diameter.     Pancreas: Visualized portions of the head and body of the pancreas are  unremarkable.      Kidneys: Both kidneys are of normal echotexture, without mass or  hydronephrosis.   The craniocaudal  dimensions are: right- 10.8 cm,  left- 11.5 cm.     Spleen: The spleen is normal in size,  measuring 10.5 cm in sagittal  dimension.     Aorta and IVC: The visualized portions of the aorta and IVC are  unremarkable.      Fluid: No evidence of ascites or pleural effusions.                                                                      IMPRESSION:   Hepatomegaly and hepatic steatosis. Otherwise, unremarkable  examination      Again, thank you for allowing me to participate in the care of your patient.        Sincerely,        Bahman Cummings PA-C

## 2021-10-25 NOTE — TELEPHONE ENCOUNTER
Maria Teresa Gay RN   10/25/2021 11:48 AM CDT Back to Top        Left message on answering machine for patient to call back primary care clinic at [phone] to speak with RN regarding results.  YUDELKA Morley Loretta Louisa, APRN CNP   10/23/2021  9:22 PM CDT         Please call   Ultrasound shows enlarged and fatty liver   Please keep appointment with Gastroenterology as referral was placed at end of August   Shannon Escalante NP, APRN CNP

## 2021-10-27 LAB
MITOCHONDRIA M2 IGG SER-ACNC: 1.3 U/ML
PETH BLD-MCNC: 598 NG/ML

## 2021-10-28 LAB
TTG IGA SER-ACNC: 1.4 U/ML
TTG IGG SER-ACNC: 0.9 U/ML

## 2021-10-29 LAB
A1AT PHENOTYP SERPL-IMP: NORMAL
A1AT SERPL-MCNC: 156 MG/DL
A1AT SS SERPL-MCNC: NEGATIVE G/L
A1AT SZ SERPL-MCNC: NORMAL G/L
A1AT ZZ SERPL-MCNC: NEGATIVE G/L
SPECIMEN SOURCE: NORMAL

## 2022-01-01 ENCOUNTER — HEALTH MAINTENANCE LETTER (OUTPATIENT)
Age: 52
End: 2022-01-01

## 2022-02-11 ENCOUNTER — OFFICE VISIT (OUTPATIENT)
Dept: FAMILY MEDICINE | Facility: CLINIC | Age: 52
End: 2022-02-11
Payer: COMMERCIAL

## 2022-02-11 ENCOUNTER — NURSE TRIAGE (OUTPATIENT)
Dept: NURSING | Facility: CLINIC | Age: 52
End: 2022-02-11

## 2022-02-11 VITALS
WEIGHT: 169.2 LBS | BODY MASS INDEX: 24.99 KG/M2 | HEART RATE: 95 BPM | TEMPERATURE: 98.8 F | RESPIRATION RATE: 18 BRPM | OXYGEN SATURATION: 99 % | DIASTOLIC BLOOD PRESSURE: 84 MMHG | SYSTOLIC BLOOD PRESSURE: 144 MMHG

## 2022-02-11 DIAGNOSIS — R03.0 ELEVATED BLOOD PRESSURE READING WITHOUT DIAGNOSIS OF HYPERTENSION: ICD-10-CM

## 2022-02-11 DIAGNOSIS — R06.02 SOB (SHORTNESS OF BREATH): ICD-10-CM

## 2022-02-11 DIAGNOSIS — J18.9 PNEUMONIA OF RIGHT LOWER LOBE DUE TO INFECTIOUS ORGANISM: Primary | ICD-10-CM

## 2022-02-11 DIAGNOSIS — R05.9 COUGH: ICD-10-CM

## 2022-02-11 LAB
BASOPHILS # BLD AUTO: 0.1 10E3/UL (ref 0–0.2)
BASOPHILS NFR BLD AUTO: 1 %
D DIMER PPP FEU-MCNC: <0.27 UG/ML FEU (ref 0–0.5)
EOSINOPHIL # BLD AUTO: 0.2 10E3/UL (ref 0–0.7)
EOSINOPHIL NFR BLD AUTO: 3 %
ERYTHROCYTE [DISTWIDTH] IN BLOOD BY AUTOMATED COUNT: 12.3 % (ref 10–15)
HCT VFR BLD AUTO: 41.2 % (ref 35–47)
HGB BLD-MCNC: 14.2 G/DL (ref 11.7–15.7)
LYMPHOCYTES # BLD AUTO: 1.3 10E3/UL (ref 0.8–5.3)
LYMPHOCYTES NFR BLD AUTO: 23 %
MCH RBC QN AUTO: 38.9 PG (ref 26.5–33)
MCHC RBC AUTO-ENTMCNC: 34.5 G/DL (ref 31.5–36.5)
MCV RBC AUTO: 113 FL (ref 78–100)
MONOCYTES # BLD AUTO: 0.5 10E3/UL (ref 0–1.3)
MONOCYTES NFR BLD AUTO: 9 %
NEUTROPHILS # BLD AUTO: 3.9 10E3/UL (ref 1.6–8.3)
NEUTROPHILS NFR BLD AUTO: 65 %
PLATELET # BLD AUTO: 156 10E3/UL (ref 150–450)
RBC # BLD AUTO: 3.65 10E6/UL (ref 3.8–5.2)
WBC # BLD AUTO: 5.9 10E3/UL (ref 4–11)

## 2022-02-11 PROCEDURE — 36415 COLL VENOUS BLD VENIPUNCTURE: CPT | Performed by: PHYSICIAN ASSISTANT

## 2022-02-11 PROCEDURE — 99214 OFFICE O/P EST MOD 30 MIN: CPT | Performed by: PHYSICIAN ASSISTANT

## 2022-02-11 PROCEDURE — 85379 FIBRIN DEGRADATION QUANT: CPT | Performed by: PHYSICIAN ASSISTANT

## 2022-02-11 PROCEDURE — 85025 COMPLETE CBC W/AUTO DIFF WBC: CPT | Performed by: PHYSICIAN ASSISTANT

## 2022-02-11 RX ORDER — DOXYCYCLINE 100 MG/1
1 TABLET ORAL 2 TIMES DAILY
COMMUNITY
Start: 2022-02-09 | End: 2022-02-18

## 2022-02-11 RX ORDER — CEFUROXIME AXETIL 500 MG/1
1 TABLET ORAL 2 TIMES DAILY
COMMUNITY
Start: 2022-02-09 | End: 2022-02-18

## 2022-02-11 NOTE — TELEPHONE ENCOUNTER
Caller is seeking lab results; unable to access Christy   Advised that results not reviewed by provider  But reassured that any critical value would be handled with urgency   Caller will await  Notification from provider   Kerry Tidwell RN  FNA       Reason for Disposition    [1] Caller requesting NON-URGENT health information AND [2] PCP's office is the best resource    Protocols used: INFORMATION ONLY CALL - NO TRIAGE-A-

## 2022-02-11 NOTE — PATIENT INSTRUCTIONS
I will notify you of lab results via [employer]   Return urgently if any change in symptoms.    Send FMLA forms through [employer] once you have completed your section     Follow up with Diana in approximately one month- you will need a repeat xray to make sure pneumonia has cleared

## 2022-02-11 NOTE — PROGRESS NOTES
Assessment & Plan     Pneumonia of right lower lobe due to infectious organism  Day after visit able to review EMERGENCY DEPARTMENT note and presumed pneumonia vs atelectasis- she had declined all blood testing and ekg etc  Was not aware EMERGENCY DEPARTMENT worried about ACS, PE, anemia, electrolyte abnormality or covid19 until reviewing note day after visit   Is on doxycycline and ceftin- if symptoms don't improve over the weekend - recommend additional labs and EKG     Cough  As above   - CBC with platelets and differential  - D dimer, quantitative  - CBC with platelets and differential  - D dimer, quantitative    SOB (shortness of breath)  Reports can't get a deep breath. Normal saturation - stat Ddimer negative - no PE, not anemic- have not ruled out other etiologies for shortness of breath   Normal saturation - does not appear dyspneic or uncomfortable  - CBC with platelets and differential  - D dimer, quantitative  - CBC with platelets and differential  - D dimer, quantitative    Elevated blood pressure   Follow up with pcp in one month to reevaluate- consider antihypertensives at that visit     Review of prior external note(s) from - [facility] reviewed  Review of the result(s) of each unique test - cbc, ddimer  Ordering of each unique test         Patient Instructions   I will notify you of lab results via [employer]   Return urgently if any change in symptoms.    Send FMLA forms through [employer] once you have completed your section     Follow up with Diana in approximately one month- you will need a repeat xray to make sure pneumonia has cleared       Return in about 4 weeks (around 3/11/2022) for in person, Routine preventive.    Nkechi Acosta PA-C  [facility]    Dae Singh is a 51 year old who presents for the following health issues     HPI     ED/UC Followup:    Facility:  [facility]  Date of visit: 2/8/2022  Reason for  visit: Pneumonia   Current Status: Fatigue, SOB and coughing       Complains of fatigue, shortness of breath and cough for approximately 1 1/2 months. Seen in EMERGENCY DEPARTMENT 3 days ago and diagnosed with pneumonia- however issues with epic and I cannot download visit from EMERGENCY DEPARTMENT -she reports she was diagnosed with pneumonia and  Treated with ceftin and doxycycline.  Has concerns for covid- every home test negative   Shortness of breath was new and cough not improving   She reports she had a Chest xray -pneumonia- wanted to do some iv fluids and she declined.  No blood work was done in EMERGENCY DEPARTMENT- patient did not offer that blood work was recommended but not completed due to her anxiety  Drinking ok   Just tired  Has not had even 3 full days of medications  Sometimes chills then hot then chills then warm  Last night first night good restful sleep-   Non smoker  No appetite.  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      Objective    BP (!) 144/84 (BP Location: Right arm, Patient Position: Sitting, Cuff Size: Adult Regular)   Pulse 95   Temp 98.8  F (37.1  C) (Oral)   Resp 18   Wt 76.7 kg (169 lb 3.2 oz)   SpO2 99%   Breastfeeding No   BMI 24.99 kg/m    Body mass index is 24.99 kg/m .  Physical Exam   GENERAL: healthy, alert and no distress  NECK: no adenopathy, no asymmetry, masses, or scars and thyroid normal to palpation  RESP: lungs clear to auscultation - no rales, rhonchi or wheezes  CV: regular rate and rhythm, normal S1 S2, no S3 or S4, no murmur, click or rub, no peripheral edema and  peripheral pulses strong  ABDOMEN: soft, nontender, no hepatosplenomegaly, no masses and bowel sounds normal  MS: no gross musculoskeletal defects noted, no edema  PSYCH: mentation appears normal, anxious, judgement and insight intact and appearance well groomed

## 2022-02-12 NOTE — TELEPHONE ENCOUNTER
Phone call to patient  Normal DDimer and blood counts are ok.   Cautiously reassured. Follow up with us as needed and in one month

## 2022-02-12 NOTE — RESULT ENCOUNTER NOTE
Dear Samantha  Your Ddimer was negative.  You do NOT have a blood clot to the lung.  Your blood counts are ok.  Continue the antibiotics until gone.  Follow up with us if not improving over the next 7-10 days   Return urgently if any change in symptoms.    Follow up with Diana in approximately one month.  Please call or MyChart my office with any questions or concerns.   Nkechi Acosta, PAC

## 2022-02-16 ENCOUNTER — TELEPHONE (OUTPATIENT)
Dept: FAMILY MEDICINE | Facility: CLINIC | Age: 52
End: 2022-02-16
Payer: COMMERCIAL

## 2022-02-16 NOTE — TELEPHONE ENCOUNTER
Patient was called and left a detailed message of having an appt with Dave on Friday 2/18/2022 and to call the clinic to have that appt set up before the end of the week.    Both numbers were attempted and no voicemail on work number

## 2022-02-18 ENCOUNTER — OFFICE VISIT (OUTPATIENT)
Dept: FAMILY MEDICINE | Facility: CLINIC | Age: 52
End: 2022-02-18
Payer: COMMERCIAL

## 2022-02-18 ENCOUNTER — ANCILLARY PROCEDURE (OUTPATIENT)
Dept: GENERAL RADIOLOGY | Facility: CLINIC | Age: 52
End: 2022-02-18
Attending: PHYSICIAN ASSISTANT
Payer: COMMERCIAL

## 2022-02-18 VITALS
DIASTOLIC BLOOD PRESSURE: 82 MMHG | RESPIRATION RATE: 18 BRPM | SYSTOLIC BLOOD PRESSURE: 130 MMHG | WEIGHT: 172.2 LBS | HEART RATE: 94 BPM | OXYGEN SATURATION: 99 % | TEMPERATURE: 98.3 F | HEIGHT: 69 IN | BODY MASS INDEX: 25.51 KG/M2

## 2022-02-18 DIAGNOSIS — R53.83 FATIGUE, UNSPECIFIED TYPE: ICD-10-CM

## 2022-02-18 DIAGNOSIS — J18.9 PNEUMONIA OF RIGHT LOWER LOBE DUE TO INFECTIOUS ORGANISM: ICD-10-CM

## 2022-02-18 DIAGNOSIS — E87.6 HYPOKALEMIA: ICD-10-CM

## 2022-02-18 DIAGNOSIS — F41.9 ANXIETY: ICD-10-CM

## 2022-02-18 DIAGNOSIS — J18.9 PNEUMONIA OF RIGHT LOWER LOBE DUE TO INFECTIOUS ORGANISM: Primary | ICD-10-CM

## 2022-02-18 DIAGNOSIS — F32.1 CURRENT MODERATE EPISODE OF MAJOR DEPRESSIVE DISORDER WITHOUT PRIOR EPISODE (H): ICD-10-CM

## 2022-02-18 DIAGNOSIS — R79.89 ELEVATED LFTS: ICD-10-CM

## 2022-02-18 LAB
ALBUMIN SERPL-MCNC: 4 G/DL (ref 3.4–5)
ALP SERPL-CCNC: 112 U/L (ref 40–150)
ALT SERPL W P-5'-P-CCNC: 117 U/L (ref 0–50)
ANION GAP SERPL CALCULATED.3IONS-SCNC: 12 MMOL/L (ref 3–14)
AST SERPL W P-5'-P-CCNC: 226 U/L (ref 0–45)
BILIRUB SERPL-MCNC: 1.1 MG/DL (ref 0.2–1.3)
BUN SERPL-MCNC: 2 MG/DL (ref 7–30)
CALCIUM SERPL-MCNC: 10.1 MG/DL (ref 8.5–10.1)
CHLORIDE BLD-SCNC: 91 MMOL/L (ref 94–109)
CO2 SERPL-SCNC: 27 MMOL/L (ref 20–32)
CREAT SERPL-MCNC: 0.5 MG/DL (ref 0.52–1.04)
GFR SERPL CREATININE-BSD FRML MDRD: >90 ML/MIN/1.73M2
GLUCOSE BLD-MCNC: 114 MG/DL (ref 70–99)
POTASSIUM BLD-SCNC: 3.2 MMOL/L (ref 3.4–5.3)
PROT SERPL-MCNC: 7.9 G/DL (ref 6.8–8.8)
SODIUM SERPL-SCNC: 130 MMOL/L (ref 133–144)
TSH SERPL DL<=0.005 MIU/L-ACNC: 3.35 MU/L (ref 0.4–4)

## 2022-02-18 PROCEDURE — 96127 BRIEF EMOTIONAL/BEHAV ASSMT: CPT | Performed by: PHYSICIAN ASSISTANT

## 2022-02-18 PROCEDURE — 36415 COLL VENOUS BLD VENIPUNCTURE: CPT | Performed by: PHYSICIAN ASSISTANT

## 2022-02-18 PROCEDURE — 80053 COMPREHEN METABOLIC PANEL: CPT | Performed by: PHYSICIAN ASSISTANT

## 2022-02-18 PROCEDURE — 84443 ASSAY THYROID STIM HORMONE: CPT | Performed by: PHYSICIAN ASSISTANT

## 2022-02-18 PROCEDURE — 71046 X-RAY EXAM CHEST 2 VIEWS: CPT | Performed by: FAMILY MEDICINE

## 2022-02-18 PROCEDURE — 93000 ELECTROCARDIOGRAM COMPLETE: CPT | Performed by: PHYSICIAN ASSISTANT

## 2022-02-18 PROCEDURE — 99214 OFFICE O/P EST MOD 30 MIN: CPT | Performed by: PHYSICIAN ASSISTANT

## 2022-02-18 ASSESSMENT — PAIN SCALES - GENERAL: PAINLEVEL: NO PAIN (0)

## 2022-02-18 ASSESSMENT — PATIENT HEALTH QUESTIONNAIRE - PHQ9
10. IF YOU CHECKED OFF ANY PROBLEMS, HOW DIFFICULT HAVE THESE PROBLEMS MADE IT FOR YOU TO DO YOUR WORK, TAKE CARE OF THINGS AT HOME, OR GET ALONG WITH OTHER PEOPLE: VERY DIFFICULT
SUM OF ALL RESPONSES TO PHQ QUESTIONS 1-9: 13
SUM OF ALL RESPONSES TO PHQ QUESTIONS 1-9: 13

## 2022-02-18 ASSESSMENT — ANXIETY QUESTIONNAIRES
GAD7 TOTAL SCORE: 11
4. TROUBLE RELAXING: SEVERAL DAYS
6. BECOMING EASILY ANNOYED OR IRRITABLE: NOT AT ALL
1. FEELING NERVOUS, ANXIOUS, OR ON EDGE: NEARLY EVERY DAY
2. NOT BEING ABLE TO STOP OR CONTROL WORRYING: NEARLY EVERY DAY
5. BEING SO RESTLESS THAT IT IS HARD TO SIT STILL: NOT AT ALL
7. FEELING AFRAID AS IF SOMETHING AWFUL MIGHT HAPPEN: SEVERAL DAYS
GAD7 TOTAL SCORE: 11
GAD7 TOTAL SCORE: 11
3. WORRYING TOO MUCH ABOUT DIFFERENT THINGS: NEARLY EVERY DAY
7. FEELING AFRAID AS IF SOMETHING AWFUL MIGHT HAPPEN: SEVERAL DAYS

## 2022-02-18 NOTE — PATIENT INSTRUCTIONS
Try melatonin at bedtime   Take hydroxyzine 2 tablets every 6 hours as needed   Follow up with us in one month   Work on exercise and improve diet   Take a multivitamin daily

## 2022-02-18 NOTE — PROGRESS NOTES
Answers for HPI/ROS submitted by the patient on 2/18/2022  If you checked off any problems, how difficult have these problems made it for you to do your work, take care of things at home, or get along with other people?: Very difficult  PHQ9 TOTAL SCORE: 13  PIETER 7 TOTAL SCORE: 11      Assessment & Plan     Pneumonia of right lower lobe due to infectious organism  Was treated for pneumonia but normal chest xray so doubt pneumonia- continue antibiotic until gone- EKG unremarkable -  - EKG 12-lead complete w/read - Clinics  - XR Chest 2 Views  - [facility]erral    Fatigue, unspecified type  Normal thyroid function. Elevated lfts- not taking any medications to account for this   - Comprehensive metabolic panel (BMP + Alb, Alk Phos, ALT, AST, Total. Bili, TP)  - TSH with free T4 reflex  - Comprehensive metabolic panel (BMP + Alb, Alk Phos, ALT, AST, Total. Bili, TP)  - TSH with free T4 reflex  - [facility] Referral  - CBC with platelets and differential    Elevated LFTs  Return for additional labs- patient notified via [facility]   - Hepatic function panel  - Hepatitis A Antibody IgG  - Hepatitis A antibody IgM  - Hepatitis B surface antigen  - Ferritin  - GGT  - Iron & Iron Binding Capacity  - INR  - Hepatitis C Screen Reflex to HCV RNA Quant and Genotype  - Basic metabolic panel  (Ca, Cl, CO2, Creat, Gluc, K, Na, BUN)  - US Abdomen Complete  - CBC with platelets and differential    Anxiety  Moderate major depression  Referred to psychologist- declines med other than will try 2 hydroxyzine as needed basis rather than one - previously prescribed by Diana Escalante- doesn't want daily medication  Follow up with us in one month    Hypokalemia  Not on any medications to contribute- given potassium rich foods  And recheck labs in one week    Review of the result(s) of each unique test - cbc, tsh, cmp, cxr  Ordering of each unique test  Prescription drug management         BMI:   Estimated  "body mass index is 25.43 kg/m  as calculated from the following:    Height as of this encounter: 1.753 m (5' 9\").    Weight as of this encounter: 78.1 kg (172 lb 3.2 oz).   Weight management plan: Discussed healthy diet and exercise guidelines    Depression Screening Follow Up    PHQ 2/18/2022   PHQ-9 Total Score 13   Q9: Thoughts of better off dead/self-harm past 2 weeks Not at all     Last PHQ-9 2/18/2022   1.  Little interest or pleasure in doing things 2   2.  Feeling down, depressed, or hopeless 1   3.  Trouble falling or staying asleep, or sleeping too much 3   4.  Feeling tired or having little energy 3   5.  Poor appetite or overeating 3   6.  Feeling bad about yourself 1   7.  Trouble concentrating 0   8.  Moving slowly or restless 0   Q9: Thoughts of better off dead/self-harm past 2 weeks 0   PHQ-9 Total Score 13       Follow Up Actions Taken  Mental Health Referral placed     Patient Instructions   Try melatonin at bedtime   Take hydroxyzine 2 tablets every 6 hours as needed   Follow up with us in one month   Work on exercise and improve diet   Take a multivitamin daily       Return in about 4 weeks (around 3/18/2022), or if symptoms worsen or fail to improve, for in person.    Nkechi Acosta PA-C  [facility]    Dae Singh is a 51 year old who presents for the following health issues     History of Present Illness       Mental Health Follow-up:  Patient presents to follow-up on Depression & Anxiety.Patient's depression since last visit has been:  No change  The patient is not having other symptoms associated with depression.  Patient's anxiety since last visit has been:  Bad  The patient is having other symptoms associated with anxiety.  Any significant life events: job concerns  Patient is feeling anxious or having panic attacks.  Patient has no concerns about alcohol or drug use.     Social History  Tobacco Use    Smoking status: Never Smoker    Smokeless tobacco: " Never Used  Alcohol use: Yes    Comment: social  Drug use: Not Currently      Today's PHQ-9         PHQ-9 Total Score:     (P) 13   PHQ-9 Q9 Thoughts of better off dead/self-harm past 2 weeks :   (P) Not at all   Thoughts of suicide or self harm:      Self-harm Plan:        Self-harm Action:          Safety concerns for self or others:           Hypertension: She presents for follow up of hypertension.  She does not check blood pressure  regularly outside of the clinic. Outside blood pressures have been over 140/90. She does not follow a low salt diet.     Vascular Disease:  She presents for follow up of vascular disease.  She never takes nitroglycerin. She is not taking daily aspirin.    She eats 0-1 servings of fruits and vegetables daily.She consumes 1 sweetened beverage(s) daily.She exercises with enough effort to increase her heart rate 9 or less minutes per day.  She exercises with enough effort to increase her heart rate 3 or less days per week.   She is taking medications regularly.     Still really fatigued and shortness of breath - completing course of  ceftin and doxycycline prescribed by emergency department in the next 3 days- prescribed for pneumonia - and at last visit I was unable to review emergency department visit but now able and looks like not sure had pneumonia     Have a lot of anxiety about work- up at 4 AM worried aobut work- burden on me to get better- thinking about work all night long  Not felt well for long time before diagnosed with pneumonia   No chest pain but has shortness of breath   Same since all this started - symptoms 2 months maybe longer  Super fatigued- not sleeping not eating  Sisters and mother with history - anxiety and depression  Samantha doesn't want to take daily medication    Mom passed away   Doesn't eat fast food.  Pastas and veggies and salads maybe chicken but loss of appetite last couple months     Abnormal Mood Symptoms  Onset/Duration:   Description: fatigue couple  "months   Depression (if yes, do PHQ-9): YES- doesn't feel like low mood   Anxiety (if yes, do PIETER-7): YES  Accompanying Signs & Symptoms:  Still participating in activities that you used to enjoy: no  Fatigue: YES  Irritability: no  Difficulty concentrating: no  Changes in appetite: YES  Problems with sleep: YES  Heart racing/beating fast: YES  Abnormally elevated, expansive, or irritable mood: no  Persistently increased activity or energy: no  Thoughts of hurting yourself or others: no  History:  Recent stress or major life event: YES  Prior depression or anxiety: None  Family history of depression or anxiety: YES  Alcohol/drug use: no  Difficulty sleeping: YES  Precipitating or alleviating factors: None  Therapies tried and outcome: hydroxyzine 1 tablet without improvement   PHQ 2/18/2022   PHQ-9 Total Score 13   Q9: Thoughts of better off dead/self-harm past 2 weeks Not at all     PIETER-7 SCORE 2/18/2022   Total Score 11 (moderate anxiety)   Total Score 11       Complains of persistent shortness of breath over couple months. No chest pain . No fever, sweats, chills  Loves her job but has not been able to work due to symptoms    Review of Systems   Constitutional, HEENT, cardiovascular, pulmonary, gi and gu systems are negative, except as otherwise noted.      Objective    /82 (BP Location: Right arm, Patient Position: Sitting, Cuff Size: Adult Regular)   Pulse 94   Temp 98.3  F (36.8  C) (Temporal)   Resp 18   Ht 1.753 m (5' 9\")   Wt 78.1 kg (172 lb 3.2 oz)   SpO2 99%   BMI 25.43 kg/m    Body mass index is 25.43 kg/m .  Physical Exam   GENERAL: healthy, alert and no distress  NECK: no adenopathy, no asymmetry, masses, or scars and thyroid normal to palpation  RESP: lungs clear to auscultation - no rales, rhonchi or wheezes  CV: regular rate and rhythm, normal S1 S2, no S3 or S4, no murmur, click or rub, no peripheral edema and peripheral pulses strong  ABDOMEN: soft, nontender, no hepatosplenomegaly, " no masses and bowel sounds normal  MS: no gross musculoskeletal defects noted, no edema  PSYCH: mentation appears normal, affect flat, anxious, judgement and insight intact and appearance well groomed    Results for orders placed or performed in visit on 02/18/22   XR Chest 2 Views     Status: None    Narrative    CHEST TWO VIEWS 2/18/2022 2:36 PM     HISTORY: Pneumonia of right lower lobe due to infectious organism    COMPARISON: None.       Impression    IMPRESSION: There are no acute infiltrates. The cardiac silhouette is  not enlarged. Pulmonary vasculature is unremarkable.    SUJEY LANDIS MD         SYSTEM ID:  [id number]   Results for orders placed or performed in visit on 02/18/22   Comprehensive metabolic panel (BMP + Alb, Alk Phos, ALT, AST, Total. Bili, TP)     Status: Abnormal   Result Value Ref Range    Sodium 130 (L) 133 - 144 mmol/L    Potassium 3.2 (L) 3.4 - 5.3 mmol/L    Chloride 91 (L) 94 - 109 mmol/L    Carbon Dioxide (CO2) 27 20 - 32 mmol/L    Anion Gap 12 3 - 14 mmol/L    Urea Nitrogen 2 (L) 7 - 30 mg/dL    Creatinine 0.50 (L) 0.52 - 1.04 mg/dL    Calcium 10.1 8.5 - 10.1 mg/dL    Glucose 114 (H) 70 - 99 mg/dL    Alkaline Phosphatase 112 40 - 150 U/L     (H) 0 - 45 U/L     (H) 0 - 50 U/L    Protein Total 7.9 6.8 - 8.8 g/dL    Albumin 4.0 3.4 - 5.0 g/dL    Bilirubin Total 1.1 0.2 - 1.3 mg/dL    GFR Estimate >90 >60 mL/min/1.73m2   TSH with free T4 reflex     Status: Normal   Result Value Ref Range    TSH 3.35 0.40 - 4.00 mU/L   EKG 12-lead complete w/read - Clinics     Status: None    Impression    Sinus rhythm - low voltage.  No Qwaves- low voltage- no acute ischemic changes.      XR Chest 2 Views    Result Date: 2/18/2022  CHEST TWO VIEWS 2/18/2022 2:36 PM HISTORY: Pneumonia of right lower lobe due to infectious organism COMPARISON: None.     IMPRESSION: There are no acute infiltrates. The cardiac silhouette is not enlarged. Pulmonary vasculature is unremarkable. SUJEY LANDIS MD    SYSTEM ID:  [id number]

## 2022-02-19 PROBLEM — F41.9 ANXIETY: Status: ACTIVE | Noted: 2022-02-19

## 2022-02-19 PROBLEM — F32.1 CURRENT MODERATE EPISODE OF MAJOR DEPRESSIVE DISORDER WITHOUT PRIOR EPISODE (H): Status: ACTIVE | Noted: 2022-02-19

## 2022-02-19 ASSESSMENT — ANXIETY QUESTIONNAIRES: GAD7 TOTAL SCORE: 11

## 2022-02-19 ASSESSMENT — PATIENT HEALTH QUESTIONNAIRE - PHQ9: SUM OF ALL RESPONSES TO PHQ QUESTIONS 1-9: 13

## 2022-02-19 NOTE — RESULT ENCOUNTER NOTE
"Dear Samantha  Your sodium and potassium are both low  You are not on any medications that should cause this.  Please increase the potassium in your diet with below and schedule a lab only appointment within the week to recheck- you do not need to fast    Your liver function tests are very abnormal.  When you come in for labs next week we will recheck some things- please schedule an abdominal ultrasound at [facility] ([phone]) formerly called [facility] as soon as possible.    Your thyroid function was normal    The normal adult diet usually contains 50-100mEq of potassium per day. More potassium is needed when there is excess loss of potassium from the body. Diuretic medicines (\"water pills\") or prolonged diarrhea and vomiting can cause this. To increase the amount of potassium in your diet, include these foods.         [The (*) indicates highest potassium.]   VEGETABLES   Artichokes - cooked 1/2 cup 400-600 mg (10-15mEq) *   Asparagus - fresh or frozen 1/2 cup 200-400 mg (5-10mEq)   Beans, white, red, georges 1/2 cup 400-600 mg (10-15mEq) *   Beets - fresh or cooked 1/2 cup 200-400 mg (5-10mEq)   Broccoli - cooked, fresh or frozen 1/2 cup 200-400 mg (5-10mEq)   Brussel sprouts - fresh or frozen 1/2 cup 200-400 mg (5-10mEq)   Cabbage, raw 1 cup 200-400 mg (5-10mEq)   Carrots - fresh, raw or cooked 1/2 cup 200-400 mg (5-10mEq)   Celery - raw 1 cup 200-400 mg (5-10mEq)   Eggplant - baked 1/2 cup 200-400 mg (5-10mEq)   [place] Beans - fresh or frozen 1/2 cup 200-400 mg (5-10mEq)   Mushrooms - fresh, cooked 1/2 cup 200-400 mg (5-10mEq)   Peas - cooked 1/2 cup 200-400 mg (5-10mEq)   Potatoes - baked 1/2 cup 400-600 mg (10-15mEq) *   Spinach - raw, chopped 2 cups 400-600 mg (10-15mEq) *   Spinach - whole leaves 3 cups 400-600 mg (10-15mEq) *   Squash, winter - fresh, frozen, cooked 1/2 cup 200-400 mg (5-10mEq)   Tomato - fresh 1 med 200-400 mg (5-10mEq)   Tomato juice - canned 1/2 cup " 200-400 mg (5-10mEq)   FRUITS   Apple juice - fresh or canned, unsweetened 1 cup 200-400 mg (5-10mEq)   Apricots - fresh 2 med 200-400 mg (5-10mEq)   Apricots - canned 1/2 cup 200-400 mg (5-10mEq)   Apricots - dried 4 pieces 200-400 mg (5-10mEq)   Banana - fresh 1 small 200-400 mg (5-10mEq)   Blackberries - fresh or frozen 1 cup 200-400 mg (5-10mEq)   Cantaloupe - fresh 6 inch diameter 400-600 mg (10-15mEq) *   Grape juice, unsweetened 1 cup 200-400 mg (5-10mEq)   Honeydew melon - fresh 7 inch diameter 400-600 mg (10-15mEq) *   Orange juice, unsweetened, fresh or frozen 1/2 cup 200-400 mg (5-10mEq)   Orange - fresh 1 med 200-400 mg (5-10mEq)   Mandarin - fresh 1 med 200-400 mg (5-10mEq)   Pineapple juice - unsweetened, canned/frozen 1 cup 200-400 mg (5-10mEq)   Prune juice - unsweetened, canned 1/2 cup 200-400 mg (5-10mEq)   Prunes - dried 8 pieces 200-400 mg (5-10mEq)   Raspberries - fresh or frozen 1 cup 200-400 mg (5-10mEq)   Strawberries - fresh or frozen 1 cup 200-400 mg (5-10mEq)   Tangerine - fresh 1 med 200-400 mg (5-10mEq)   MEAT   Red Meat - cooked 3 oz 200-400 mg (5-10mEq)   Shrimp - fresh or cooked 3/4 cup 200-400 mg (5-10mEq)   Tuna - fresh or canned 3/4 cup 200-400 mg (5-10mEq)   Cod, Flounder, Halibut - cooked 3.5 oz 400-600 mg (10-15mEq) *   [place] - fresh or cooked 3.5 oz 400-600 mg (10-15mEq) *   Scallops - cooked 3.5 oz 400-600 mg (10-15mEq) *   [Modified from Diet Manual, Clinical Center, National Institutes of Health]     0321-0198 [employer], [address] [place] [address], [place] [address]. All rights reserved. This information is not intended as a substitute for professional medical care. Always follow your healthcare professional's instructions     Please call or MyChart my office with any questions or concerns.   Nkechi Acosta, PAC

## 2022-02-21 ENCOUNTER — TELEPHONE (OUTPATIENT)
Dept: FAMILY MEDICINE | Facility: CLINIC | Age: 52
End: 2022-02-21
Payer: COMMERCIAL

## 2022-02-21 NOTE — TELEPHONE ENCOUNTER
Pt called back and has now read the [employer] message.  She will schedule the non fasting lab appointment in one week and the ultrasound.  Ana PEREAN, RN

## 2022-02-21 NOTE — TELEPHONE ENCOUNTER
Attempted to contact patient to relay provider result note written below.  No answer, left message requesting a call back due to lengthy message. Requested return call to RN team at [phone].    Sheba Graham RN, BSN  [facility]

## 2022-02-21 NOTE — TELEPHONE ENCOUNTER
"Please call and advise with below recommendations- and send letter with lab results with diet information    Patient has not read [facility] result      Dear Samantha  Your sodium and potassium are both low  You are not on any medications that should cause this.  Please increase the potassium in your diet with below and schedule a lab only appointment within the week to recheck- you do not need to fast     Your liver function tests are very abnormal.  When you come in for labs next week we will recheck some things- please schedule an abdominal ultrasound at [facility] ([phone]) formerly called [facility] as soon as possible.     Your thyroid function was normal     The normal adult diet usually contains 50-100mEq of potassium per day. More potassium is needed when there is excess loss of potassium from the body. Diuretic medicines (\"water pills\") or prolonged diarrhea and vomiting can cause this. To increase the amount of potassium in your diet, include these foods.         [The (*) indicates highest potassium.]   VEGETABLES   Artichokes - cooked 1/2 cup 400-600 mg (10-15mEq) *   Asparagus - fresh or frozen 1/2 cup 200-400 mg (5-10mEq)   Beans, white, red, georges 1/2 cup 400-600 mg (10-15mEq) *   Beets - fresh or cooked 1/2 cup 200-400 mg (5-10mEq)   Broccoli - cooked, fresh or frozen 1/2 cup 200-400 mg (5-10mEq)   Brussel sprouts - fresh or frozen 1/2 cup 200-400 mg (5-10mEq)   Cabbage, raw 1 cup 200-400 mg (5-10mEq)   Carrots - fresh, raw or cooked 1/2 cup 200-400 mg (5-10mEq)   Celery - raw 1 cup 200-400 mg (5-10mEq)   Eggplant - baked 1/2 cup 200-400 mg (5-10mEq)   Alvarado Beans - fresh or frozen 1/2 cup 200-400 mg (5-10mEq)   Mushrooms - fresh, cooked 1/2 cup 200-400 mg (5-10mEq)   Peas - cooked 1/2 cup 200-400 mg (5-10mEq)   Potatoes - baked 1/2 cup 400-600 mg (10-15mEq) *   Spinach - raw, chopped 2 cups 400-600 mg (10-15mEq) *   Spinach - whole leaves 3 cups 400-600 mg (10-15mEq) * "   Squash, winter - fresh, frozen, cooked 1/2 cup 200-400 mg (5-10mEq)   Tomato - fresh 1 med 200-400 mg (5-10mEq)   Tomato juice - canned 1/2 cup 200-400 mg (5-10mEq)   FRUITS   Apple juice - fresh or canned, unsweetened 1 cup 200-400 mg (5-10mEq)   Apricots - fresh 2 med 200-400 mg (5-10mEq)   Apricots - canned 1/2 cup 200-400 mg (5-10mEq)   Apricots - dried 4 pieces 200-400 mg (5-10mEq)   Banana - fresh 1 small 200-400 mg (5-10mEq)   Blackberries - fresh or frozen 1 cup 200-400 mg (5-10mEq)   Cantaloupe - fresh 6 inch diameter 400-600 mg (10-15mEq) *   Grape juice, unsweetened 1 cup 200-400 mg (5-10mEq)   Honeydew melon - fresh 7 inch diameter 400-600 mg (10-15mEq) *   Orange juice, unsweetened, fresh or frozen 1/2 cup 200-400 mg (5-10mEq)   Orange - fresh 1 med 200-400 mg (5-10mEq)   Mandarin - fresh 1 med 200-400 mg (5-10mEq)   Pineapple juice - unsweetened, canned/frozen 1 cup 200-400 mg (5-10mEq)   Prune juice - unsweetened, canned 1/2 cup 200-400 mg (5-10mEq)   Prunes - dried 8 pieces 200-400 mg (5-10mEq)   Raspberries - fresh or frozen 1 cup 200-400 mg (5-10mEq)   Strawberries - fresh or frozen 1 cup 200-400 mg (5-10mEq)   Tangerine - fresh 1 med 200-400 mg (5-10mEq)   MEAT   Red Meat - cooked 3 oz 200-400 mg (5-10mEq)   Shrimp - fresh or cooked 3/4 cup 200-400 mg (5-10mEq)   Tuna - fresh or canned 3/4 cup 200-400 mg (5-10mEq)   Cod, Flounder, Halibut - cooked 3.5 oz 400-600 mg (10-15mEq) *   [place] - fresh or cooked 3.5 oz 400-600 mg (10-15mEq) *   Scallops - cooked 3.5 oz 400-600 mg (10-15mEq) *   [Modified from Diet Manual, Clinical Center, National Institutes of Health]     [id number] [facility] [place], [address] [place] [address], [place] [address]. All rights reserved. This information is not intended as a substitute for professional medical care. Always follow your healthcare professional's instructions      Please call or MyChart my office with any questions or concerns.   Nkechi Acosta, PAC

## 2022-02-25 ENCOUNTER — LAB (OUTPATIENT)
Dept: LAB | Facility: CLINIC | Age: 52
End: 2022-02-25
Payer: COMMERCIAL

## 2022-02-25 ENCOUNTER — ANCILLARY PROCEDURE (OUTPATIENT)
Dept: ULTRASOUND IMAGING | Facility: CLINIC | Age: 52
End: 2022-02-25
Attending: PHYSICIAN ASSISTANT
Payer: COMMERCIAL

## 2022-02-25 DIAGNOSIS — R53.83 FATIGUE, UNSPECIFIED TYPE: ICD-10-CM

## 2022-02-25 DIAGNOSIS — R79.89 ELEVATED LFTS: ICD-10-CM

## 2022-02-25 DIAGNOSIS — R79.89 ELEVATED LFTS: Primary | ICD-10-CM

## 2022-02-25 LAB
ALBUMIN SERPL-MCNC: 3.6 G/DL (ref 3.4–5)
ALP SERPL-CCNC: 127 U/L (ref 40–150)
ALT SERPL W P-5'-P-CCNC: 108 U/L (ref 0–50)
ANION GAP SERPL CALCULATED.3IONS-SCNC: 7 MMOL/L (ref 3–14)
AST SERPL W P-5'-P-CCNC: 155 U/L (ref 0–45)
BASOPHILS # BLD AUTO: 0.1 10E3/UL (ref 0–0.2)
BASOPHILS NFR BLD AUTO: 1 %
BILIRUB DIRECT SERPL-MCNC: 0.5 MG/DL (ref 0–0.2)
BILIRUB SERPL-MCNC: 0.9 MG/DL (ref 0.2–1.3)
BUN SERPL-MCNC: 5 MG/DL (ref 7–30)
CALCIUM SERPL-MCNC: 9 MG/DL (ref 8.5–10.1)
CHLORIDE BLD-SCNC: 107 MMOL/L (ref 94–109)
CO2 SERPL-SCNC: 30 MMOL/L (ref 20–32)
CREAT SERPL-MCNC: 0.48 MG/DL (ref 0.52–1.04)
EOSINOPHIL # BLD AUTO: 0.1 10E3/UL (ref 0–0.7)
EOSINOPHIL NFR BLD AUTO: 2 %
ERYTHROCYTE [DISTWIDTH] IN BLOOD BY AUTOMATED COUNT: 14.2 % (ref 10–15)
FERRITIN SERPL-MCNC: 360 NG/ML (ref 8–252)
GFR SERPL CREATININE-BSD FRML MDRD: >90 ML/MIN/1.73M2
GGT SERPL-CCNC: 726 U/L (ref 0–40)
GLUCOSE BLD-MCNC: 126 MG/DL (ref 70–99)
HAV IGG SER QL IA: NONREACTIVE
HAV IGM SERPL QL IA: NONREACTIVE
HBV SURFACE AG SERPL QL IA: NONREACTIVE
HCT VFR BLD AUTO: 36.4 % (ref 35–47)
HCV AB SERPL QL IA: NONREACTIVE
HGB BLD-MCNC: 12.8 G/DL (ref 11.7–15.7)
IMM GRANULOCYTES # BLD: 0 10E3/UL
IMM GRANULOCYTES NFR BLD: 0 %
INR PPP: 1.11 (ref 0.85–1.15)
IRON SATN MFR SERPL: 67 % (ref 15–46)
IRON SERPL-MCNC: 177 UG/DL (ref 35–180)
LYMPHOCYTES # BLD AUTO: 1 10E3/UL (ref 0.8–5.3)
LYMPHOCYTES NFR BLD AUTO: 21 %
MCH RBC QN AUTO: 40.5 PG (ref 26.5–33)
MCHC RBC AUTO-ENTMCNC: 35.2 G/DL (ref 31.5–36.5)
MCV RBC AUTO: 115 FL (ref 78–100)
MONOCYTES # BLD AUTO: 0.6 10E3/UL (ref 0–1.3)
MONOCYTES NFR BLD AUTO: 12 %
NEUTROPHILS # BLD AUTO: 2.8 10E3/UL (ref 1.6–8.3)
NEUTROPHILS NFR BLD AUTO: 64 %
NRBC # BLD AUTO: 0 10E3/UL
NRBC BLD AUTO-RTO: 0 /100
PLATELET # BLD AUTO: 233 10E3/UL (ref 150–450)
POTASSIUM BLD-SCNC: 4 MMOL/L (ref 3.4–5.3)
PROT SERPL-MCNC: 7.5 G/DL (ref 6.8–8.8)
RBC # BLD AUTO: 3.16 10E6/UL (ref 3.8–5.2)
SODIUM SERPL-SCNC: 144 MMOL/L (ref 133–144)
TIBC SERPL-MCNC: 265 UG/DL (ref 240–430)
WBC # BLD AUTO: 4.5 10E3/UL (ref 4–11)

## 2022-02-25 PROCEDURE — 85025 COMPLETE CBC W/AUTO DIFF WBC: CPT

## 2022-02-25 PROCEDURE — 80053 COMPREHEN METABOLIC PANEL: CPT

## 2022-02-25 PROCEDURE — 86708 HEPATITIS A ANTIBODY: CPT

## 2022-02-25 PROCEDURE — 76700 US EXAM ABDOM COMPLETE: CPT | Performed by: RADIOLOGY

## 2022-02-25 PROCEDURE — 82977 ASSAY OF GGT: CPT

## 2022-02-25 PROCEDURE — 82728 ASSAY OF FERRITIN: CPT

## 2022-02-25 PROCEDURE — 86709 HEPATITIS A IGM ANTIBODY: CPT

## 2022-02-25 PROCEDURE — 87340 HEPATITIS B SURFACE AG IA: CPT

## 2022-02-25 PROCEDURE — 83550 IRON BINDING TEST: CPT

## 2022-02-25 PROCEDURE — 85610 PROTHROMBIN TIME: CPT

## 2022-02-25 PROCEDURE — 86803 HEPATITIS C AB TEST: CPT

## 2022-02-25 PROCEDURE — 82248 BILIRUBIN DIRECT: CPT

## 2022-02-25 PROCEDURE — 36415 COLL VENOUS BLD VENIPUNCTURE: CPT

## 2022-02-25 NOTE — RESULT ENCOUNTER NOTE
Dear Samantha  Your ultrasound looks ok.  Please call or MyChart my office with any questions or concerns.   I do not see a follow up appointment scheduled.   Nkechi Acosta, PAC

## 2022-02-25 NOTE — RESULT ENCOUNTER NOTE
Dear Samantha  Your liver tests remain elevated.  Please schedule follow up with gastroenterologist at [facility] ([phone]) formerly called [facility] as soon as able.  I do not think this is an emergency but I am not sure what is causing your fatigue and elevated liver tests.  Please follow up with me or Diana in a month.  Please call or MyChart my office with any questions or concerns.   Nkechi Acosta, PAC

## 2022-02-26 NOTE — RESULT ENCOUNTER NOTE
Dear Samantha  Your hepatitis tests were negative.   Please call or MyChart my office with any questions or concerns.   Nkechi Acosta, PAC

## 2022-03-22 ENCOUNTER — PRE VISIT (OUTPATIENT)
Dept: GASTROENTEROLOGY | Facility: CLINIC | Age: 52
End: 2022-03-22
Payer: COMMERCIAL

## 2022-03-29 ENCOUNTER — VIRTUAL VISIT (OUTPATIENT)
Dept: URGENT CARE | Facility: CLINIC | Age: 52
End: 2022-03-29
Payer: COMMERCIAL

## 2022-03-29 DIAGNOSIS — R19.7 DIARRHEA, UNSPECIFIED TYPE: ICD-10-CM

## 2022-03-29 DIAGNOSIS — M54.2 TENDERNESS OF NECK: Primary | ICD-10-CM

## 2022-03-29 PROCEDURE — 99207 PR NO CHARGE LOS: CPT | Performed by: PHYSICIAN ASSISTANT

## 2022-03-29 NOTE — PROGRESS NOTES
Samantha is a 51 year old who is being evaluated via a billable video visit.      After discussing her symptoms and concerns it was determined that she would be better served by being evaluated in person. She will go in person to the urgent care for evaluation of her tender swollen neck and other symptoms.

## 2022-03-30 ENCOUNTER — OFFICE VISIT (OUTPATIENT)
Dept: URGENT CARE | Facility: URGENT CARE | Age: 52
End: 2022-03-30
Payer: COMMERCIAL

## 2022-03-30 VITALS
SYSTOLIC BLOOD PRESSURE: 156 MMHG | HEIGHT: 69 IN | TEMPERATURE: 97.5 F | HEART RATE: 83 BPM | WEIGHT: 167.9 LBS | OXYGEN SATURATION: 100 % | BODY MASS INDEX: 24.87 KG/M2 | DIASTOLIC BLOOD PRESSURE: 97 MMHG

## 2022-03-30 DIAGNOSIS — R59.1 LYMPHADENOPATHY: Primary | ICD-10-CM

## 2022-03-30 DIAGNOSIS — K52.9 GASTROENTERITIS: ICD-10-CM

## 2022-03-30 LAB
DEPRECATED S PYO AG THROAT QL EIA: NEGATIVE
FLUAV AG SPEC QL IA: NEGATIVE
FLUBV AG SPEC QL IA: NEGATIVE
GROUP A STREP BY PCR: NOT DETECTED

## 2022-03-30 PROCEDURE — U0003 INFECTIOUS AGENT DETECTION BY NUCLEIC ACID (DNA OR RNA); SEVERE ACUTE RESPIRATORY SYNDROME CORONAVIRUS 2 (SARS-COV-2) (CORONAVIRUS DISEASE [COVID-19]), AMPLIFIED PROBE TECHNIQUE, MAKING USE OF HIGH THROUGHPUT TECHNOLOGIES AS DESCRIBED BY CMS-2020-01-R: HCPCS | Performed by: EMERGENCY MEDICINE

## 2022-03-30 PROCEDURE — 87804 INFLUENZA ASSAY W/OPTIC: CPT | Performed by: EMERGENCY MEDICINE

## 2022-03-30 PROCEDURE — 99214 OFFICE O/P EST MOD 30 MIN: CPT | Performed by: EMERGENCY MEDICINE

## 2022-03-30 PROCEDURE — U0005 INFEC AGEN DETEC AMPLI PROBE: HCPCS | Performed by: EMERGENCY MEDICINE

## 2022-03-30 PROCEDURE — 87651 STREP A DNA AMP PROBE: CPT | Performed by: EMERGENCY MEDICINE

## 2022-03-30 RX ORDER — ONDANSETRON 4 MG/1
4 TABLET, ORALLY DISINTEGRATING ORAL EVERY 8 HOURS PRN
Qty: 12 TABLET | Refills: 0 | Status: SHIPPED | OUTPATIENT
Start: 2022-03-30

## 2022-03-30 RX ORDER — HYDROXYZINE HYDROCHLORIDE 10 MG/1
10 TABLET, FILM COATED ORAL 3 TIMES DAILY PRN
COMMUNITY

## 2022-03-30 RX ORDER — LOPERAMIDE HYDROCHLORIDE 2 MG/1
2 TABLET ORAL 4 TIMES DAILY PRN
Qty: 10 TABLET | Refills: 0 | Status: SHIPPED | OUTPATIENT
Start: 2022-03-30

## 2022-03-30 NOTE — PROGRESS NOTES
Assessment & Plan     Diagnosis:    (R59.1) Lymphadenopathy  (primary encounter diagnosis)    (K52.9) Gastroenteritis  Plan: ondansetron (ZOFRAN-ODT) 4 MG ODT tab,         loperamide (IMODIUM A-D) 2 MG tablet      Medical Decision Making:  Samantha Saravia is a 51 year old female who presents to clinic today for evaluation of neck pain and swollen lymph node on the right pre-auricular region. Patient also reports nausea, vomiting and diarrhea for the past 3-4 days. She has been dry-heaving; not throwing up lately. She has no complaints of abdominal pain and benign abdominal exam here.    I considered a broad differential diagnosis for this patient including influenza, Covid, strep pharyngitis, viral gastroenteritis, bacterial infection of the large intestine, pyelonephritis, appendicitis, etc. There are no signs of worrisome intra-abdominal pathologies detected during the visit today.  She has a completely benign abdominal exam without rebound, guarding, or marked tenderness to palpation.    Moreover, there is no sign of mastoiditis, mass, dental abscess, or peritonsillar abscess. The patient will be started on medications for symptomatic management and may take Tylenol or ibuprofen for pain.  Discussed that her lymphadenopathy is likely reactive to a viral illness and this should improve over time, she should follow-up with her primary care for recheck in 7 to 10 days. Return or go to the ER if increasing pain, fever, hearing decrease or discharge.          Trvais Noble PA-C  [facility]    Subjective     Samantha Saravia is a 51 year old female who presents to clinic today for the following health issues:  Chief Complaint   Patient presents with     Lymphedema     Right lymph Nodes swelling.     Cough     Diarrhea       HPI  Samantha Saravia is a 51 year old female who presents to clinic today for evaluation of neck pain and swollen lymph node on the right pre-auricular region. Patient also reports nausea,  "vomiting and diarrhea for the past 3-4 days. She has been dry-heaving; not throwing up lately. She has no complaints of abdominal pain    Course of illness is waxing and waning.    Severity moderate  Current and Associated symptoms: sore throat, nausea, vomiting and diarrhea  Treatment measures tried include Tylenol/Ibuprofen, Fluids and Rest.  Predisposing factors include None.      Patient describes the symptoms as \"my lymph node is painful\"     Patient denies any cough, difficulties swallowing or breathing, chest pain, dark or bloody stools or emesis, fever, lightheadedness, palpitations, or other concerns.    Review of Systems    See HPI    Objective      Vitals: BP (!) 156/97 (BP Location: Left arm, Patient Position: Sitting, Cuff Size: Adult Regular)   Pulse 83   Temp 97.5  F (36.4  C) (Tympanic)   Ht 1.753 m (5' 9\")   Wt 76.2 kg (167 lb 14.4 oz)   SpO2 100%   BMI 24.79 kg/m    Resp: 16    Patient Vitals for the past 24 hrs:   BP Temp Temp src Pulse SpO2 Height Weight   03/30/22 1006 (!) 156/97 97.5  F (36.4  C) Tympanic 83 100 % 1.753 m (5' 9\") 76.2 kg (167 lb 14.4 oz)       Vital signs reviewed by: Travis Noble PA-C    Physical Exam   Constitutional: Patient is alert and cooperative. No acute distress.  HENT:   Right Ear: External ear normal. TM is clear.  Left Ear: External ear normal. TM is clear.   No tenderness with manipulation of the pinna or tragus.  No mastoid tenderness or erythema bilaterally.  Neck: Mild pre-auricular lymphadenopathy on the right. No cervical lymphadenopathy.   Nose: Nose normal.    Mouth: Normal tongue and tonsil. Posterior oropharynx is clear.  Eyes: Conjunctivae, EOMI and lids are normal. PERRL.  Cardiovascular: Regular rate and rhythm  Pulmonary/Chest: Lungs are clear to auscultation throughout. Effort normal. No respiratory distress. No wheezes, rales or rhonchi.  GI: Abdomen is soft and non-tender throughout.  Neurological: Alert and oriented x3. CN 3-7 and 9-12 " intact.   Musculoskeletal: Normal range of motion. Normal tone.  Skin: No rash noted on visualized skin.  Psychiatric:The patient has a normal mood and affect.     Labs/Imaging:  Influenza A/B: negative    Rapid Strep: Negative      Strep PCR: Pending    COVID-19: Pending      Travis Noble PA-C, March 30, 2022

## 2022-03-30 NOTE — LETTER
[facility] URGENT CARE [address] [address]          March 30, 2022    RE:  Samantha Saravia                                                                                                                                                       [address] [facility] [address]            To whom it may concern:    Samantha Saravia is under my professional care for    Lymphadenopathy  Gastroenteritis She  may return to work with the following: The employee is UNABLE to return to work until 4/2/2022. Please excuse her absence from 3/25//22 - 3/31/22    Sincerely,        Travis Noble PA-C

## 2022-03-31 LAB — SARS-COV-2 RNA RESP QL NAA+PROBE: NEGATIVE

## 2022-04-02 ENCOUNTER — HEALTH MAINTENANCE LETTER (OUTPATIENT)
Age: 52
End: 2022-04-02

## 2022-04-04 ENCOUNTER — OFFICE VISIT (OUTPATIENT)
Dept: URGENT CARE | Facility: URGENT CARE | Age: 52
End: 2022-04-04
Payer: COMMERCIAL

## 2022-04-04 VITALS
OXYGEN SATURATION: 95 % | WEIGHT: 166.9 LBS | TEMPERATURE: 97.2 F | BODY MASS INDEX: 24.72 KG/M2 | DIASTOLIC BLOOD PRESSURE: 96 MMHG | SYSTOLIC BLOOD PRESSURE: 147 MMHG | HEIGHT: 69 IN | HEART RATE: 84 BPM

## 2022-04-04 DIAGNOSIS — H65.91 RIGHT NON-SUPPURATIVE OTITIS MEDIA: Primary | ICD-10-CM

## 2022-04-04 DIAGNOSIS — J01.00 ACUTE MAXILLARY SINUSITIS, RECURRENCE NOT SPECIFIED: ICD-10-CM

## 2022-04-04 PROCEDURE — 99213 OFFICE O/P EST LOW 20 MIN: CPT | Performed by: EMERGENCY MEDICINE

## 2022-04-04 RX ORDER — AZITHROMYCIN 250 MG/1
TABLET, FILM COATED ORAL
Qty: 6 TABLET | Refills: 0 | Status: SHIPPED | OUTPATIENT
Start: 2022-04-04 | End: 2022-04-09

## 2022-04-04 RX ORDER — FLUTICASONE PROPIONATE 50 MCG
1 SPRAY, SUSPENSION (ML) NASAL DAILY
Qty: 9.9 ML | Refills: 0 | Status: SHIPPED | OUTPATIENT
Start: 2022-04-04

## 2022-04-04 NOTE — LETTER
[facility] URGENT CARE [address] [address]          April 4, 2022    RE:  Samantha Saravia                                                                                                                                                       [address] [facility] [address]            To whom it may concern:    Samantha Saravia is under my professional care for    Right non-suppurative otitis media  Acute maxillary sinusitis, recurrence not specified She  may return to work with the following: The employee is UNABLE to return to work until 4/6/2022. Please excuse her absence 4/4 and 4/5      Sincerely,        Travis Noble PA-C

## 2022-04-04 NOTE — PATIENT INSTRUCTIONS
Patient Education     Middle Ear Infection (Adult)   You have an infection of the middle ear, the space behind the eardrum. This is also called acute otitis media (AOM). Sometimes it's caused by the common cold. This is because congestion can block the internal passage (eustachian tube) that drains fluid from the middle ear. When the middle ear fills with fluid, bacteria can grow there and cause an infection. Oral antibiotics are used to treat this illness, not ear drops. Symptoms usually start to improve within 1 to 2 days of treatment.    Home care  The following are general care guidelines:    Finish all of the antibiotic medicine given, even though you may feel better after the first few days.    You may use over-the-counter medicine, such as acetaminophen or ibuprofen, to control pain and fever, unless something else was prescribed. Talk with your healthcare provider before using these medicines if you have chronic liver or kidney disease. Also talk with your provider if you have had a stomach ulcer or digestive bleeding. Don't give aspirin to anyone under 18 years of age who has a fever. It may cause severe illness or death.  Follow-up care  Follow up with your healthcare provider in 2 weeks, or as advised, if all symptoms have not gotten better, or if hearing doesn't go back to normal within 1 month.  When to seek medical advice  Call your healthcare provider right away if any of these occur:    Ear pain gets worse or does not improve after 3 days of treatment    Unusual drowsiness or confusion    Neck pain, stiff neck, or headache    Fluid or blood draining from the ear canal    Fever of 100.4 F (38 C) or as advised     Seizure  [employer] last reviewed this educational content on 9/1/2019 2000-2021 [employer]. All rights reserved. This information is not intended as a substitute for professional medical care. Always follow your healthcare professional's instructions.

## 2022-04-04 NOTE — PROGRESS NOTES
"Assessment & Plan     Diagnosis:    (H65.91) Right non-suppurative otitis media  (primary encounter diagnosis)    (J01.00) Acute maxillary sinusitis, recurrence not specified      Medical Decision Making:    Samantha Saravia is a 51 year old female presents to clinic for concern for ear infection. The patient has an exam consistent with otitis media.  Labs last week for influenza A/B and the rapid strep test are negative, strep PCR and COVID-19 PCR negative as well. She notes improvement in her GI symptoms and has no signs of dehydration. There is no sign of mastoiditis, dental abscess, or peritonsillar abscess. The patient will be started on antibiotics and may take dose appropriate Tylenol or ibuprofen for pain. Flonase recommended as I suspect some degree of sinus and eustachian tube blockage/congestion are contributing to her symptoms.  Return if increasing pain, worsening fever, hearing decrease or discharge.  Follow-up with pediatrician or ENT in 7-10 days. Caregiver voices understanding and agreement with the plan including reasons to go to the ER.      Travis Noble PA-C  [facility] URGENT CARE    Subjective     Samantha Saravia is a 51 year old female who presents to clinic today for the following health issues:  Chief Complaint   Patient presents with     Pharyngitis     Otalgia     Cough       HPI    Onset of symptoms was 1.5 week(s) ago.  Course of illness is waxing and waning.    Severity moderate  Current and Associated symptoms: ear pain right, sore throat, facial pain/pressure, headache, body aches and fatigue  Denies fever, cough - non-productive, shortness of breath, nausea, vomiting, diarrhea, not eating and not sleeping well  Treatment measures tried include Tylenol/Ibuprofen  Predisposing factors include None  History of PE tubes? No There is no loss of hearing, drainage from the ear, difficulties breathing or swallowing. Patient states that it feels like \"lots of pressure in the face and right " "ear.\"     Review of Systems    See HPI    Objective      Vitals: BP (!) 147/96 (BP Location: Right arm, Patient Position: Sitting, Cuff Size: Adult Regular)   Pulse 84   Temp 97.2  F (36.2  C) (Tympanic)   Ht 1.753 m (5' 9\")   Wt 75.7 kg (166 lb 14.4 oz)   SpO2 95%   BMI 24.65 kg/m    Resp: 16    Patient Vitals for the past 24 hrs:   BP Temp Temp src Pulse SpO2 Height Weight   04/04/22 1013 (!) 147/96 97.2  F (36.2  C) Tympanic 84 95 % 1.753 m (5' 9\") 75.7 kg (166 lb 14.4 oz)       Vital signs reviewed by: Travis Noble PA-C    Physical Exam   Constitutional: Alert and active. With caregiver; in no acute distress.  HENT: Ears: Right TM is erythematous and bulging. Left TM is erythematous; no bulging. No perforation. Bilateral external ear canals and auricles are normal. No tenderness with manipulation of the pinnae and tragus. No mastoid tenderness bilaterally.  Nose: Nose normal.    Mouth: Normal tongue and tonsil. Posterior oropharynx is clear. Uvula is midline.  Cardiovascular: Regular rate and rhythm  Pulmonary/Chest: Effort normal. No respiratory distress. Lungs clear to auscultation bilaterally.  Skin: No rash noted on visualized skin or face.        Travis Noble PA-C, April 4, 2022      "

## 2022-04-06 ENCOUNTER — TELEPHONE (OUTPATIENT)
Dept: URGENT CARE | Facility: URGENT CARE | Age: 52
End: 2022-04-06
Payer: COMMERCIAL

## 2022-04-06 NOTE — LETTER
[facility] URGENT CARE [address] [address]          April 6, 2022    RE:  Samantha Saravia                                                                                                                                                       [address] [facility] [address]            To whom it may concern:    Samantha Saravia was seen on 4/4/2022 for illness.  She may return to work with no restrictions when fever free.  I would expect this to be in the next 1-2 days.    Sincerely,        Brian Merida PA-C  4/6/2022

## 2022-04-06 NOTE — TELEPHONE ENCOUNTER
Patient states she needs work note to be off today also.  Please call when ready for  or can send to [employer].    Thank you.

## 2022-04-08 ENCOUNTER — NURSE TRIAGE (OUTPATIENT)
Dept: NURSING | Facility: CLINIC | Age: 52
End: 2022-04-08
Payer: COMMERCIAL

## 2022-04-08 ENCOUNTER — VIRTUAL VISIT (OUTPATIENT)
Dept: FAMILY MEDICINE | Facility: CLINIC | Age: 52
End: 2022-04-08
Payer: COMMERCIAL

## 2022-04-08 DIAGNOSIS — H92.01 EARACHE ON RIGHT: Primary | ICD-10-CM

## 2022-04-08 PROCEDURE — 99213 OFFICE O/P EST LOW 20 MIN: CPT | Mod: GT | Performed by: PHYSICIAN ASSISTANT

## 2022-04-08 RX ORDER — CEFDINIR 300 MG/1
300 CAPSULE ORAL 2 TIMES DAILY
Qty: 14 CAPSULE | Refills: 0 | Status: SHIPPED | OUTPATIENT
Start: 2022-04-08 | End: 2022-04-15

## 2022-04-08 NOTE — PROGRESS NOTES
Samantha is a 51 year old who is being evaluated via a billable video visit.      How would you like to obtain your AVS? MyChart  If the video visit is dropped, the invitation should be resent by: Text to cell phone: [phone]  Will anyone else be joining your video visit? No  Video Start Time: 7:51 AM    Assessment & Plan   Problem List Items Addressed This Visit     None      Visit Diagnoses     Earache on right    -  Primary    Relevant Medications    cefdinir (OMNICEF) 300 MG capsule    Other Relevant Orders    Otolaryngology Referral         Impression is likely viral illness with bilateral AOM R>L. Will treat with cefdinir and azithromycin ineffective. She will continue with otc pain meds and decongestants and follow up with ENT if not improving in the next month. Appears well and non-toxic and I have low suspicion for systemic illness, impending airway obstruction or respiratory distress.    DDx and Dx discussed with and explained to the pt to their satisfaction.  All questions were answered at this time. Pt expressed understanding of and agreement with this dx, tx, and plan. No further workup warranted and standard medication warnings given. I have given the patient a list of pertinent indications for re-evaluation. Will go to the Emergency Department if symptoms worsen or new concerning symptoms arise. Patient left the call in no apparent distress.     23 minutes spent on the date of the encounter doing chart review, history and exam, documentation and further activities per the note     See Patient Instructions    Return in about 1 month (around 5/8/2022) for a recheck of your symptoms if not improving, or call 911/go to an ER anytime if worsening.    CRYS Chou  [facility] GREGORY Singh is a 51 year old who presents for the following health issues.    HPI     Was seen previously for sinus issues at [facility] on 4/4/22 and treated for AOM with  azithromycin. Symptoms are not improving. Still having pressure in ears and a cough.  Right ear is painful and feels that something needs to drain. Left ear has pressure. Muffled hearing in right ear. Associated chills. No sob or chest pain. Negative flu, strep and COVID previously during this illness. Vaccinated against the latter x 2.    Review of Systems   Constitutional, HEENT, cardiovascular, pulmonary, gi and gu systems are negative, except as otherwise noted.      Objective       Vitals:  No vitals were obtained today due to virtual visit.    Physical Exam   GENERAL: Healthy, alert and no distress  EYES: Eyes grossly normal to inspection.  No discharge or erythema, or obvious scleral/conjunctival abnormalities.  RESP: No audible wheeze, cough, or visible cyanosis.  No visible retractions or increased work of breathing.    SKIN: Visible skin clear. No significant rash, abnormal pigmentation or lesions.  NEURO: Cranial nerves grossly intact.  Mentation and speech appropriate for age.  PSYCH: Mentation appears normal, affect normal/bright, judgement and insight intact, normal speech and appearance well-groomed.     Video-Visit Details    Type of service:  Video Visit    Video End Time:8:05 AM    Originating Location (pt. Location): Home    Distant Location (provider location):  [facility] [employer]     Platform used for Video Visit: RF nano

## 2022-04-08 NOTE — TELEPHONE ENCOUNTER
Patient is calling and states that she is on 5th day of Z-Pack.  Samantha cannot hear out of her right ear.  Patient has also been taking flonase.  Today is having pressure in her left ear.  Patient was seen on 4/4/2022 and diagnosed with Otitis Media.  Patient cannot hear out of right ear and today pressure in her left ear.  Denies discharge from ears.  Patient is requesting an e-visit today or a virtual visit.  Patient states that she needs a note for her work also.  Patient states that she was tested for covid and was negative.    COVID 19 Nurse Triage Plan/Patient Instructions    Please be aware that novel coronavirus (COVID-19) may be circulating in the community. If you develop symptoms such as fever, cough, or SOB or if you have concerns about the presence of another infection including coronavirus (COVID-19), please contact your health care provider or visit [email or web address].     Disposition/Instructions    Virtual Visit with provider recommended. Reference Visit Selection Guide.    Thank you for taking steps to prevent the spread of this virus.  o Limit your contact with others.  o Wear a simple mask to cover your cough.  o Wash your hands well and often.    Resources    [facility]: About COVID-19: [email or web address]    CDC: What to Do If You're Sick: www.cdc.gov/coronavirus/2019-ncov/about/steps-when-sick.html    CDC: Ending Home Isolation: www.cdc.gov/coronavirus/2019-ncov/hcp/disposition-in-home-patients.html     CDC: Caring for Someone: www.cdc.gov/coronavirus/2019-ncov/if-you-are-sick/care-for-someone.html     [facility]: Interim Guidance for Hospital Discharge to Home: www.health.[facility].[place].us/diseases/coronavirus/hcp/hospdischarge.pdf    [facility] clinical trials (COVID-19 research studies): [email or web address].[email or web address]/umn-clinical-trials     Below are the COVID-19 hotlines at the [place] Department of Health ([facility]). Interpreters are available.   o For health questions:  Call 045-590-5748 or 1-658.558.4244 (7 a.m. to 7 p.m.)  o For questions about schools and childcare: Call [phone] or 1-926.496.1953 (7 a.m. to 7 p.m.)                       Reason for Disposition    Patient wants to be seen    Additional Information    Negative: Earache lasts > 1 hour    Negative: Pus or cloudy discharge from ear canal    Protocols used: EAR - CONGESTION-A-OH

## 2022-04-08 NOTE — LETTER
April 8, 2022      Samantha Saravia  [address] [facility] [address]        To Whom It May Concern:    Samantha Saravia was seen in our clinic. She may return to work without restrictions on 4/11/22. Please excuse her absence.      Sincerely,        CRYS Chou

## 2022-04-08 NOTE — PATIENT INSTRUCTIONS
Austin Singh,    Thank you for allowing [facility] to manage your care.    I sent your prescriptions to your pharmacy.    For your pain, please use Ibuprofen 400mg four times daily with food. Between ibuprofen doses, you may use Tylenol 650mg.     Max acetaminophen (Tylenol) 4,000mg/24 hours  Max ibuprofen 3,200mg/24 hours    Continue Flonase and add extended release Sudafed to help with drainage of your middle ear.    Drink 8-10 glasses of fluid daily to stay well-hydrated. Take a probiotic pill, eat live culture yogurt (Greek yogurt or Activia), drink kefir daily for one week after finishing the antibiotics to encourage growth of good bacteria in your system.    I made a referral, they will be calling in approximately 1 week to set up your appointment.  If you do not hear from them, please call the specialty number on your after visit summary.     If you have any questions or concerns, please feel free to call us at [phone]    Sincerely,    Jaren Tom PA-C    Did you know?      You can schedule a video visit for follow-up appointments as well as future appointments for certain conditions.  Please see the below link.     [email or web address]    If you have not already done so,  I encourage you to sign up for [email or web address] ([email or web address]).  This will allow you to review your results, securely communicate with a provider, and schedule virtual visits as well.      Patient Education     Middle Ear Infection (Adult)   You have an infection of the middle ear, the space behind the eardrum. This is also called acute otitis media (AOM). Sometimes it's caused by the common cold. This is because congestion can block the internal passage (eustachian tube) that drains fluid from the middle ear. When the middle ear fills with fluid, bacteria can grow there and cause an infection. Oral antibiotics are used to treat this illness, not ear drops. Symptoms usually  start to improve within 1 to 2 days of treatment.    Home care  The following are general care guidelines:    Finish all of the antibiotic medicine given, even though you may feel better after the first few days.    You may use over-the-counter medicine, such as acetaminophen or ibuprofen, to control pain and fever, unless something else was prescribed. Talk with your healthcare provider before using these medicines if you have chronic liver or kidney disease. Also talk with your provider if you have had a stomach ulcer or digestive bleeding. Don't give aspirin to anyone under 18 years of age who has a fever. It may cause severe illness or death.  Follow-up care  Follow up with your healthcare provider in 2 weeks, or as advised, if all symptoms have not gotten better, or if hearing doesn't go back to normal within 1 month.  When to seek medical advice  Call your healthcare provider right away if any of these occur:    Ear pain gets worse or does not improve after 3 days of treatment    Unusual drowsiness or confusion    Neck pain, stiff neck, or headache    Fluid or blood draining from the ear canal    Fever of 100.4 F (38 C) or as advised     Seizure  [email or web address] last reviewed this educational content on 9/1/2019 2000-2021 [employer]. All rights reserved. This information is not intended as a substitute for professional medical care. Always follow your healthcare professional's instructions.

## 2022-04-11 ENCOUNTER — TELEPHONE (OUTPATIENT)
Dept: FAMILY MEDICINE | Facility: CLINIC | Age: 52
End: 2022-04-11
Payer: COMMERCIAL

## 2022-04-11 NOTE — TELEPHONE ENCOUNTER
Patient had virtual visit on 4/8/2022 with Jaren Tom.  Patient asking if letter excusing her from work can be extended through 4/11/22 and return to work 4/12/22.

## 2022-04-11 NOTE — LETTER
April 11, 2022      Samantha Saravia  [address] [facility] [address]        To Whom It May Concern:    Samantha Saravia was seen in our clinic. She may return to work without restrictions on 4/12/22. Please excuse her absence.    Sincerely,        CRYS Chou

## 2022-04-11 NOTE — TELEPHONE ENCOUNTER
"Patient calling back, says she got Ca's message but cannot find her letter dated today nor was she able to find the letter dated 4/8/22 in [email or web address].      Appears they were \"sent\" in our view; routed to TC team to assist patient in locating her letters.    Alternatively, she asks that the 4/8 and 4/11 letters be emailed to her email in demographics.    Betina Barkre RN  [facility]      "

## 2022-04-26 DIAGNOSIS — F32.1 CURRENT MODERATE EPISODE OF MAJOR DEPRESSIVE DISORDER WITHOUT PRIOR EPISODE (H): ICD-10-CM

## 2022-04-26 DIAGNOSIS — F41.9 ANXIETY: ICD-10-CM

## 2022-04-27 RX ORDER — ESCITALOPRAM OXALATE 10 MG/1
TABLET ORAL
Qty: 15 TABLET | Refills: 0 | Status: SHIPPED | OUTPATIENT
Start: 2022-04-27

## 2022-04-27 NOTE — TELEPHONE ENCOUNTER
Very small refill given, due for visit, virtual okay for further refills. Please help her set this up.  Thank you,  LI Duong, NP-C  [facility]

## 2022-04-27 NOTE — TELEPHONE ENCOUNTER
Routing refill request to provider for review/approval because:        SSRIs Protocol Failed 04/26/2022 03:40 AM   Protocol Details  PHQ-9 score less than 5 in past 6 months       Aliyah Murphy RN, BSN   [facility]

## 2022-04-28 NOTE — TELEPHONE ENCOUNTER
I called patient to inform and assist with scheduling, patient did not answer so detailed message was left with the number to call us back if she would like our assistance in creating her appointments. Also advised that "[employer] may be used to make appointments as well, thank you.

## 2022-09-21 NOTE — TELEPHONE ENCOUNTER
September 21, 2022  The envelope was not picked up from the . Placed in shred box.  Stephanie Villatoro [facility]  2nd Floor  Primary Care

## 2023-01-01 ENCOUNTER — HEALTH MAINTENANCE LETTER (OUTPATIENT)
Age: 53
End: 2023-01-01